# Patient Record
Sex: FEMALE | Race: WHITE | NOT HISPANIC OR LATINO | Employment: OTHER | URBAN - METROPOLITAN AREA
[De-identification: names, ages, dates, MRNs, and addresses within clinical notes are randomized per-mention and may not be internally consistent; named-entity substitution may affect disease eponyms.]

---

## 2017-01-10 ENCOUNTER — HOSPITAL ENCOUNTER (OUTPATIENT)
Dept: MRI IMAGING | Facility: HOSPITAL | Age: 62
Discharge: HOME | End: 2017-01-10
Attending: INTERNAL MEDICINE | Admitting: INTERNAL MEDICINE

## 2017-01-10 DIAGNOSIS — M25.561 ACUTE PAIN OF RIGHT KNEE: ICD-10-CM

## 2017-01-10 PROCEDURE — 73721 MRI JNT OF LWR EXTRE W/O DYE: CPT

## 2017-01-18 ENCOUNTER — HOSPITAL ENCOUNTER (OUTPATIENT)
Dept: GENERAL RADIOLOGY | Facility: HOSPITAL | Age: 62
Discharge: HOME | End: 2017-01-18
Attending: INTERNAL MEDICINE | Admitting: INTERNAL MEDICINE

## 2017-01-18 ENCOUNTER — OFFICE VISIT (OUTPATIENT)
Dept: ORTHOPEDIC SURGERY | Facility: CLINIC | Age: 62
End: 2017-01-18

## 2017-01-18 VITALS — BODY MASS INDEX: 22.88 KG/M2 | HEIGHT: 64 IN | WEIGHT: 134 LBS

## 2017-01-18 DIAGNOSIS — S82.201D TIBIA/FIBULA FRACTURE, RIGHT, CLOSED, WITH ROUTINE HEALING, SUBSEQUENT ENCOUNTER: Primary | ICD-10-CM

## 2017-01-18 DIAGNOSIS — R07.81 RIB PAIN ON LEFT SIDE: ICD-10-CM

## 2017-01-18 DIAGNOSIS — S82.401D TIBIA/FIBULA FRACTURE, RIGHT, CLOSED, WITH ROUTINE HEALING, SUBSEQUENT ENCOUNTER: Primary | ICD-10-CM

## 2017-01-18 PROCEDURE — 99213 OFFICE O/P EST LOW 20 MIN: CPT | Performed by: ORTHOPAEDIC SURGERY

## 2017-01-18 PROCEDURE — 71020 HC CHEST PA AND LATERAL: CPT

## 2017-01-18 NOTE — LETTER
January 22, 2017     Adama Winter MD  [address] [address]    Patient: Elaina Molina   YOB: 1955   Date of Visit: 1/18/2017       Dear Dr. Maggy MD:    Thank you for referring Elaina Molina to me for evaluation. Below are the relevant portions of my assessment and plan of care.    If you have questions, please do not hesitate to call me. I look forward to following Elaina along with you.         Sincerely,        Polo Tucker MD        CC: No Recipients  Polo Tucker MD  1/22/2017  9:36 AM  Signed  Chief Complaint   Patient presents with   • Right Knee - [facility] Care             HPI Here today for Right knee pain which has increased in intensity since the Josh break.  She denies any history of trauma.  She has a difficulty in going up and down the steps where she has a difficult time flexing her knee.  An MRI was obtained which showed that the patient had developed an interval insufficiency fracture off her proximal lateral tibial metaphysis.  There is very significant cartilage loss on the lateral tibial plateau with a marked deformity of the lateral meniscus caused by chronic lateral meniscus tear.  I discussed this finding the patient in great detail.  Clinical findings match her MRI findings I do feel like the stress fracture because of the weakness of the tibial metaphyseal bone structure caused by the relative disuse, the previous injury and also contributory is the fact that she is a heavy smoker.          Allergies   Allergen Reactions   • Codeine          Social History     Social History   • Marital status:      Spouse name: N/A   • Number of children: N/A   • Years of education: N/A     Occupational History   • Not on file.     Social History Main Topics   • Smoking status: Former Smoker   • Smokeless tobacco: Not on file   • Alcohol use No   • Drug use: No   • Sexual activity: Defer     Other Topics Concern   • Not on file     Social  History Narrative       History reviewed. No pertinent family history.    Past Surgical History   Procedure Laterality Date   • Hysterectomy     • Colonoscopy     • Cholecystectomy     • Endoscopy N/A 6/23/2016     Procedure: ESOPHAGOGASTRODUODENOSCOPY with biopsy;  Surgeon: Yan Askew MD;  Location: [facility] ENDOSCOPY;  Service:    • Colonoscopy N/A 6/23/2016     Procedure: COLONOSCOPY to cecum ;  Surgeon: Yan Askew MD;  Location: [facility] ENDOSCOPY;  Service:    • Cataract extraction         Past Medical History   Diagnosis Date   • Celiac disease    • Cervical cancer    • GERD (gastroesophageal reflux disease)    • Hypertension            There were no vitals filed for this visit.          Review of Systems   Constitutional: Negative for chills, fever and unexpected weight change.   HENT: Negative.  Negative for trouble swallowing and voice change.    Eyes: Negative.  Negative for visual disturbance.   Respiratory: Negative.  Negative for cough and shortness of breath.    Cardiovascular: Positive for leg swelling. Negative for chest pain.        If on her feet awhile her right leg swells   Gastrointestinal: Negative.  Negative for abdominal pain, nausea and vomiting.   Endocrine: Negative.  Negative for cold intolerance and heat intolerance.   Genitourinary: Negative.  Negative for difficulty urinating, frequency and urgency.   Musculoskeletal: Negative.    Skin: Negative.  Negative for rash and wound.   Allergic/Immunologic: Negative.  Negative for immunocompromised state.   Neurological: Positive for weakness. Negative for numbness.        In the legs   Hematological: Negative.  Does not bruise/bleed easily.   Psychiatric/Behavioral: Negative.  Negative for dysphoric mood. The patient is not nervous/anxious.            Physical Exam   Constitutional: She appears well-developed and well-nourished.   HENT:   Head: Normocephalic.   Nose: Nose normal.   Eyes: Conjunctivae are normal. Pupils are equal,  round, and reactive to light.   Neck: Normal range of motion.   Cardiovascular: Normal rate and intact distal pulses.    Pulmonary/Chest: Breath sounds normal.   Abdominal: Bowel sounds are normal.   Musculoskeletal: Normal range of motion. She exhibits edema and tenderness.   Neurological: She is alert. She has normal reflexes.   Skin: Skin is warm.   Psychiatric: She has a normal mood and affect. Her behavior is normal. Judgment and thought content normal.   Nursing note and vitals reviewed.              Joint/Body Part Specific Exam:  right knee. Patient has crepitus throughout range of motion. Positive patellar grind test. Mild effusion. Lachman is negative. Pivot shift is negative. Anterior and posterior drawer signs are negative. Significant joint line tenderness is noted on the medial aspect of the knee. Patient has a varus orientation of the knee. Range of motion in flexion is fo110°  degrees. Neurovascular status intact.  Dorsalis pedis and posterior tibial artery pulses are palpable. Common peroneal nerve function is well preserved. Patients gait is cautious and antalgic. Skin and soft tissues are swollen; consistent with synovitis and effusion          X-RAY Report:            Diagnostics:   MRI show a stress or insufficiency fracture of the proximal right tibial metaphysis.  There is associated with advanced chondral loss on the lateral tibial plateau deformity the lateral meniscus caused by chronic tear.  A very small effusion is noted.  These MRI findings and discussion the patient in full detail.           Elaina was seen today for establish care.    Diagnoses and all orders for this visit:    Tibia/fibula fracture, right, closed, with routine healing, subsequent encounter          Procedures          I provided this patient with educational materials regarding exercise and bone health.        Plan:   MRI reports of the stress fracture discussed with the patient great detail.    Recommended  nonoperative care for the patient.    Uses supportive brace to protect the knee.    Calcium and vitamin D for bone health.    Home-based exercise program.  Stretching strengthening exercises of the knee and strengthening of the quads and the hamstrings.    Falls precaution.    Tablet ibuprofen 600 mg tab 1 by mouth twice a day when necessary pain and discomfort.    Patient needs to cut back on smoking and nicotine use because that is making her bone a lot weaker.    Follow-up in 6 weeks.            CC To Adama Winter MD

## 2017-01-18 NOTE — MR AVS SNAPSHOT
Elaina MICHEL Tracy   1/18/2017 1:15 PM   Office Visit    Dept Phone:  [phone]   Encounter #:  [id number]    Provider:  Polo Tucker MD   Department:  [facility]                Your Full Care Plan              Your Updated Medication List          This list is accurate as of: 1/18/17  2:31 PM.  Always use your most recent med list.                amLODIPine 10 MG tablet   Commonly known as:  NORVASC       aspirin 81 MG EC tablet       CALCIUM 1000 + D 1000-800 MG-UNIT tablet   Generic drug:  Calcium Carb-Cholecalciferol       cholecalciferol 1000 UNITS tablet   Commonly known as:  VITAMIN D3       ferrous sulfate 325 (65 FE) MG tablet       omeprazole 40 MG capsule   Commonly known as:  priLOSEC       pentoxifylline 400 MG CR tablet   Commonly known as:  TRENtal       VITAMIN B-6 PO       VITAMIN C PO               Instructions     None    Patient Instructions History      Upcoming Appointments     Visit Type Date Time Department    OFFICE VISIT 1/18/2017  1:15 PM [facility]    FOLLOW UP 3/1/2017 12:50 PM [facility]      via[employer] Signup     Our records indicate that you have an active [employer] account.    You can view your After Visit Summary by going to [employer] and logging in with your [employer] username and password.  If you don't have a [employer] username and password but a parent or guardian has access to your record, the parent or guardian should login with their own [employer] username and password and access your record to view the After Visit Summary.    If you have questions, you can email [email or web address] or call [phone] to talk to our [employer] staff.  Remember, [employer] is NOT to be used for urgent needs.  For medical emergencies, dial 911.               Other Info from Your Visit           Your Appointments     Mar 01, 2017 12:50 PM EST   Follow Up with Polo Tucker MD    "  [facility] (--)    [address] [place] [address]   [phone]           Arrive 15 minutes prior to appointment.              Allergies     Codeine        Reason for Visit     Right Knee - Establish Care           Vital Signs     Height Weight Body Mass Index Smoking Status          64\" (162.6 cm) 134 lb (60.8 kg) 23 kg/m2 Former Smoker          "

## 2017-01-18 NOTE — PROGRESS NOTES
Chief Complaint   Patient presents with   • Right Knee - [facility] Care             HPI Here today for Right knee pain which has increased in intensity since the Josh break.  She denies any history of trauma.  She has a difficulty in going up and down the steps where she has a difficult time flexing her knee.  An MRI was obtained which showed that the patient had developed an interval insufficiency fracture off her proximal lateral tibial metaphysis.  There is very significant cartilage loss on the lateral tibial plateau with a marked deformity of the lateral meniscus caused by chronic lateral meniscus tear.  I discussed this finding the patient in great detail.  Clinical findings match her MRI findings I do feel like the stress fracture because of the weakness of the tibial metaphyseal bone structure caused by the relative disuse, the previous injury and also contributory is the fact that she is a heavy smoker.          Allergies   Allergen Reactions   • Codeine          Social History     Social History   • Marital status:      Spouse name: N/A   • Number of children: N/A   • Years of education: N/A     Occupational History   • Not on file.     Social History Main Topics   • Smoking status: Former Smoker   • Smokeless tobacco: Not on file   • Alcohol use No   • Drug use: No   • Sexual activity: Defer     Other Topics Concern   • Not on file     Social History Narrative       History reviewed. No pertinent family history.    Past Surgical History   Procedure Laterality Date   • Hysterectomy     • Colonoscopy     • Cholecystectomy     • Endoscopy N/A 6/23/2016     Procedure: ESOPHAGOGASTRODUODENOSCOPY with biopsy;  Surgeon: Yan Askew MD;  Location: [facility] ENDOSCOPY;  Service:    • Colonoscopy N/A 6/23/2016     Procedure: COLONOSCOPY to cecum ;  Surgeon: Yan Askew MD;  Location: [facility] ENDOSCOPY;  Service:    • Cataract extraction         Past Medical History   Diagnosis Date   • Celiac  disease    • Cervical cancer    • GERD (gastroesophageal reflux disease)    • Hypertension            There were no vitals filed for this visit.          Review of Systems   Constitutional: Negative for chills, fever and unexpected weight change.   HENT: Negative.  Negative for trouble swallowing and voice change.    Eyes: Negative.  Negative for visual disturbance.   Respiratory: Negative.  Negative for cough and shortness of breath.    Cardiovascular: Positive for leg swelling. Negative for chest pain.        If on her feet awhile her right leg swells   Gastrointestinal: Negative.  Negative for abdominal pain, nausea and vomiting.   Endocrine: Negative.  Negative for cold intolerance and heat intolerance.   Genitourinary: Negative.  Negative for difficulty urinating, frequency and urgency.   Musculoskeletal: Negative.    Skin: Negative.  Negative for rash and wound.   Allergic/Immunologic: Negative.  Negative for immunocompromised state.   Neurological: Positive for weakness. Negative for numbness.        In the legs   Hematological: Negative.  Does not bruise/bleed easily.   Psychiatric/Behavioral: Negative.  Negative for dysphoric mood. The patient is not nervous/anxious.            Physical Exam   Constitutional: She appears well-developed and well-nourished.   HENT:   Head: Normocephalic.   Nose: Nose normal.   Eyes: Conjunctivae are normal. Pupils are equal, round, and reactive to light.   Neck: Normal range of motion.   Cardiovascular: Normal rate and intact distal pulses.    Pulmonary/Chest: Breath sounds normal.   Abdominal: Bowel sounds are normal.   Musculoskeletal: Normal range of motion. She exhibits edema and tenderness.   Neurological: She is alert. She has normal reflexes.   Skin: Skin is warm.   Psychiatric: She has a normal mood and affect. Her behavior is normal. Judgment and thought content normal.   Nursing note and vitals reviewed.              Joint/Body Part Specific Exam:  right knee.  Patient has crepitus throughout range of motion. Positive patellar grind test. Mild effusion. Lachman is negative. Pivot shift is negative. Anterior and posterior drawer signs are negative. Significant joint line tenderness is noted on the medial aspect of the knee. Patient has a varus orientation of the knee. Range of motion in flexion is fo110°  degrees. Neurovascular status intact.  Dorsalis pedis and posterior tibial artery pulses are palpable. Common peroneal nerve function is well preserved. Patients gait is cautious and antalgic. Skin and soft tissues are swollen; consistent with synovitis and effusion          X-RAY Report:            Diagnostics:   MRI show a stress or insufficiency fracture of the proximal right tibial metaphysis.  There is associated with advanced chondral loss on the lateral tibial plateau deformity the lateral meniscus caused by chronic tear.  A very small effusion is noted.  These MRI findings and discussion the patient in full detail.           Elaina was seen today for establish care.    Diagnoses and all orders for this visit:    Tibia/fibula fracture, right, closed, with routine healing, subsequent encounter          Procedures          I provided this patient with educational materials regarding exercise and bone health.        Plan:   MRI reports of the stress fracture discussed with the patient great detail.    Recommended nonoperative care for the patient.    Uses supportive brace to protect the knee.    Calcium and vitamin D for bone health.    Home-based exercise program.  Stretching strengthening exercises of the knee and strengthening of the quads and the hamstrings.    Falls precaution.    Tablet ibuprofen 600 mg tab 1 by mouth twice a day when necessary pain and discomfort.    Patient needs to cut back on smoking and nicotine use because that is making her bone a lot weaker.    Follow-up in 6 weeks.            CC To Adama Winter MD

## 2017-03-01 ENCOUNTER — OFFICE VISIT (OUTPATIENT)
Dept: ORTHOPEDIC SURGERY | Facility: CLINIC | Age: 62
End: 2017-03-01

## 2017-03-01 DIAGNOSIS — S82.201D TIBIA/FIBULA FRACTURE, RIGHT, CLOSED, WITH ROUTINE HEALING, SUBSEQUENT ENCOUNTER: Primary | ICD-10-CM

## 2017-03-01 DIAGNOSIS — M17.11 PRIMARY OSTEOARTHRITIS OF RIGHT KNEE: ICD-10-CM

## 2017-03-01 DIAGNOSIS — S82.401D TIBIA/FIBULA FRACTURE, RIGHT, CLOSED, WITH ROUTINE HEALING, SUBSEQUENT ENCOUNTER: Primary | ICD-10-CM

## 2017-03-01 PROCEDURE — 99213 OFFICE O/P EST LOW 20 MIN: CPT | Performed by: ORTHOPAEDIC SURGERY

## 2017-03-01 NOTE — PROGRESS NOTES
Chief Complaint   Patient presents with   • Right Knee - Results           HPI  The patient is here today for MRI results of her right knee.  Patient walks slowly because of the pain and discomfort on the posterior lateral aspect of the right knee.  She is slowly getting a progressive valgus deformity.  She's had a knee arthroscopy several years ago where a significant amount of the lateral meniscus has been removed.  I do believe she is developing posterior medical arthrosis not only caused by her recent injury were also contributed to by the lateral meniscus pathology and the loss of chondral surface on the lateral tibial plateau's most likely related to post traumatic changes.  She has significant osteoporosis and that leads her to develop a stress fracture which matches her clinical and her MRI findings to each other.  Occasionally the knee will buckle and give out from underneath her.  She has followed several different times.  She is doing reasonably well at this point in terms of symptom control with the use of a brace and anti-inflammatory medication.  I have injected her knee in the past and at this point she is not interested in any form of surgical intervention in the form of a knee arthroplasty.      There were no vitals filed for this visit.        Review of Systems   All other systems reviewed and are negative.          Physical Exam   Constitutional: She appears well-developed.   HENT:   Head: Normocephalic and atraumatic.   Eyes: Conjunctivae are normal. Pupils are equal, round, and reactive to light.   Neck: Normal range of motion. Neck supple.   Cardiovascular: Normal rate and intact distal pulses.    Pulmonary/Chest: Effort normal and breath sounds normal.   Abdominal: Soft. Bowel sounds are normal.   Musculoskeletal: Normal range of motion. She exhibits edema and tenderness.   Neurological: She is alert. She has normal reflexes.   Skin: Skin is warm and dry.   Psychiatric: She has a normal mood  and affect. Her behavior is normal. Judgment and thought content normal.   Nursing note and vitals reviewed.          Joint/Body Part Specific Exam:  right knee. Positive grind test. Pain and tenderness is present over the lateral aspect of the knee. Patient has some tenderness and irritability of the common peroneal nerve. Lateral joint line is exquisitely painful and tender. Patella is tending to track a little bit laterally. There is thickening of the synovial membrane. Range of motion in flexion is 0-120° degrees. Dorsalis pedis and posterior tibial artery pulses are palpable. Common peroneal nerve function is well preserved. Gait is cautious and antalgic. The patient has valgus orientation of the knee with a high degree of external rotation than the contralateral side.      X-RAY Report:        Diagnostics:  MRI report from [facility] shows that there is bone marrow edema in the proximal tibial metaphysis with an incomplete trabecular stress fracture along the posterior part of the tibial metaphysis.  There is no subsidence or displacement of the fracture fragment.  There is some irregularity of the lateral tibial cortex as well with a complex degenerative tear of the posterior horn of the lateral meniscus.  The cruciate and the collateral ligaments are intact.  The small volume of the body of the meniscus is because of a prior meniscectomy.  There is advanced chondral loss of the lateral tibial plateau to signify arthritis as well.  I have discussed the MRI findings with the patient great detail.      Elaina was seen today for results.    Diagnoses and all orders for this visit:    Tibia/fibula fracture, right, closed, with routine healing, subsequent encounter    Primary osteoarthritis of right knee          Procedures        Plan:  Nonoperative care discussed with the patient this point.  She definitely does not want to undergo a knee arthroscopy.    Use a supportive brace to the knee to protect the knee from  buckling and giving out.    Tablet calcium and vitamin D for bone health.    Tablet ibuprofen 600 mg tab 1 by mouth twice a day when necessary pain and discomfort.    Falls precaution.    Home-based exercise program to strengthen the quads and the hamstrings.    Follow-up in my office in 3 months.    She is going to eventually need a total knee replacement.  Both the patient and I would like to wait for as long as possible before considering surgical intervention.

## 2017-04-14 ENCOUNTER — APPOINTMENT (OUTPATIENT)
Dept: WOMENS IMAGING | Facility: HOSPITAL | Age: 62
End: 2017-04-14

## 2017-04-14 PROCEDURE — 77063 BREAST TOMOSYNTHESIS BI: CPT | Performed by: RADIOLOGY

## 2017-04-14 PROCEDURE — G0202 SCR MAMMO BI INCL CAD: HCPCS | Performed by: RADIOLOGY

## 2017-04-18 ENCOUNTER — TRANSCRIBE ORDERS (OUTPATIENT)
Dept: ADMINISTRATIVE | Facility: HOSPITAL | Age: 62
End: 2017-04-18

## 2017-04-18 DIAGNOSIS — M81.0 OSTEOPOROSIS: Primary | ICD-10-CM

## 2017-06-07 ENCOUNTER — OFFICE VISIT (OUTPATIENT)
Dept: ORTHOPEDIC SURGERY | Facility: CLINIC | Age: 62
End: 2017-06-07

## 2017-06-07 DIAGNOSIS — Z87.81 H/O FRACTURE OF ANKLE: Primary | ICD-10-CM

## 2017-06-07 DIAGNOSIS — M17.11 PRIMARY OSTEOARTHRITIS OF RIGHT KNEE: ICD-10-CM

## 2017-06-07 PROCEDURE — 73600 X-RAY EXAM OF ANKLE: CPT | Performed by: ORTHOPAEDIC SURGERY

## 2017-06-07 PROCEDURE — 99213 OFFICE O/P EST LOW 20 MIN: CPT | Performed by: ORTHOPAEDIC SURGERY

## 2017-06-07 NOTE — PROGRESS NOTES
Chief Complaint   Patient presents with   • Right Knee - Follow-up   • Right Ankle - Follow-up           HPI the patient is here today for a follow up of her right knee and right ankle pain.Patient is doing reasonably well as far as the ankle fracture is concerned.  She does not have any significant pain at that location.  She is limping all the time though because of her knee.  The knee hurts her all the time and she states that she has some rest pain as well as night pain.  The pain is on the medial aspect the knee.  It is sharp and stabbing in character and is worse when she is walking up and down the steps.  She is very reluctant to walk on inclined surfaces because of the pain and discomfort.  She has tried intra-articular steroid injections, brace and anti-inflammatory medication with pain and discomfort and is now content templating a total knee arthroplasty.  I do feel that a knee replacement will help her symptoms in the long-term.  I would like to put off the knee replacement surgery for as long as possible.        There were no vitals filed for this visit.        Review of Systems   Constitutional: Negative.    HENT: Negative.    Eyes: Negative.    Respiratory: Negative.    Cardiovascular: Negative.    Gastrointestinal: Negative.    Endocrine: Negative.    Genitourinary: Negative.    Musculoskeletal: Negative.    Skin: Negative.    Allergic/Immunologic: Negative.    Neurological: Negative.    Hematological: Negative.    Psychiatric/Behavioral: Negative.            Physical Exam   Constitutional: She is oriented to person, place, and time. She appears well-nourished.   HENT:   Head: Atraumatic.   Eyes: EOM are normal.   Neck: Neck supple.   Cardiovascular: Normal rate and intact distal pulses.    Pulmonary/Chest: Effort normal and breath sounds normal.   Abdominal: Soft. Bowel sounds are normal.   Musculoskeletal: Normal range of motion.   Neurological: She is alert and oriented to person, place, and time.  She has normal reflexes.   Skin: Skin is warm.   Psychiatric: She has a normal mood and affect. Her behavior is normal. Judgment and thought content normal.   Nursing note and vitals reviewed.          Joint/Body Part Specific Exam:  Right ankle and distal fibula: Patient has some tenderness over the distal fibula.  The ankle mortise is stable.  Dorsiflexion 0-30°.  Plantar flexion 0-40°.  She is able to circumduct the ankle without too much difficulty.  Neurovascular status is intact.  Cap refill is 2 seconds with a brisk return.  Posterior tibial tendon function is well preserved.  Gait is cautious and slightly antalgic with a shortened stance phase.    right knee. Patient has crepitus throughout range of motion. Positive patellar grind test. Mild effusion. Lachman is negative. Pivot shift is negative. Anterior and posterior drawer signs are negative. Significant joint line tenderness is noted on the medial aspect of the knee. Patient has a varus orientation of the knee. Range of motion in flexion is for 0- 120° degrees. Neurovascular status intact.  Dorsalis pedis and posterior tibial artery pulses are palpable. Common peroneal nerve function is well preserved. Patients gait is cautious and antalgic. Skin and soft tissues are swollen; consistent with synovitis and effusion  X-RAY Report:  right Ankle X-Ray  Indication: Revision of healing of the distal fibular fracture  Views: AP, Lateral  Findings: Anatomically reduced, healed fracture  yes fracture  no bony lesion  Soft tissues within normal limits  within normal limits joint spaces  Hardware appropriately positioned not applicable    yes prior studies available for comparison.    X-RAY was ordered and reviewed by Polo Tucker MD        Diagnostics:        Elaina was seen today for follow-up and follow-up.    Diagnoses and all orders for this visit:    H/O fracture of ankle  -     XR Ankle 2 View Right            Procedures        Plan:  Uses supportive brace  to the knee to prevent it from buckling and giving out.    Miacalcin  nasal spray for bone mineral density protection.    Intra-articular steroid injection and Synvisc Visco supplementation discussed and offered to the patient but she declines at this point.    Calcium and vitamin D for bone health.    She will eventually most certainly need a total knee arthroplasty and I have discussed that with her.  She states that she is getting close to needing that the relief from knee pain and discomfort and would like to proceed with a knee arthroplasty possibly in the fall , October or November.    Follow-up in 3 months.

## 2017-09-13 ENCOUNTER — OFFICE VISIT (OUTPATIENT)
Dept: ORTHOPEDIC SURGERY | Facility: CLINIC | Age: 62
End: 2017-09-13

## 2017-09-13 VITALS — WEIGHT: 128 LBS | TEMPERATURE: 98.7 F | BODY MASS INDEX: 21.85 KG/M2 | HEIGHT: 64 IN

## 2017-09-13 DIAGNOSIS — S82.201D TIBIA/FIBULA FRACTURE, RIGHT, CLOSED, WITH ROUTINE HEALING, SUBSEQUENT ENCOUNTER: Primary | ICD-10-CM

## 2017-09-13 DIAGNOSIS — M17.11 PRIMARY OSTEOARTHRITIS OF RIGHT KNEE: ICD-10-CM

## 2017-09-13 DIAGNOSIS — S82.401D TIBIA/FIBULA FRACTURE, RIGHT, CLOSED, WITH ROUTINE HEALING, SUBSEQUENT ENCOUNTER: Primary | ICD-10-CM

## 2017-09-13 PROCEDURE — 99213 OFFICE O/P EST LOW 20 MIN: CPT | Performed by: ORTHOPAEDIC SURGERY

## 2017-09-13 NOTE — PROGRESS NOTES
Chief Complaint   Patient presents with   • Right Knee - Follow-up, Pain   • Right Ankle - Follow-up, Pain           HPI patient continues to have pain and discomfort in the ankle and the right knee.  She is about status quo.  She does have pain and discomfort on the medial aspect the knee.  She walks very gingerly because she is afraid to fall.  Unfortunately her bony skeletal is very fragile on account of the injury as well as the years of nicotine consumption.  She does not want to consider surgical intervention at this point in terms of knee arthroplasty and I certainly agree with that.  She is not going to be able to go back to her job as a  is 89 with her musculoskeletal structures in the current condition she does use a brace on the knee to prevent it from buckling and giving out.  At this point she is getting motivated to join an exercise program to help improve her bone mineral density and the bone mass with a regular exercise program.  In addition she is making a commitment to quit the use of nicotine as well.        Vitals:    09/13/17 1326   Temp: 98.7 °F (37.1 °C)           Review of Systems   Constitutional: Negative for chills, fever and unexpected weight change.   HENT: Negative for trouble swallowing and voice change.    Eyes: Negative for visual disturbance.   Respiratory: Negative for cough and shortness of breath.    Cardiovascular: Negative for chest pain and leg swelling.   Gastrointestinal: Negative for abdominal pain, nausea and vomiting.   Endocrine: Negative for cold intolerance and heat intolerance.   Genitourinary: Negative for difficulty urinating, frequency and urgency.   Musculoskeletal: Positive for arthralgias, joint swelling and myalgias.   Skin: Negative for rash and wound.   Allergic/Immunologic: Negative for immunocompromised state.   Neurological: Negative for weakness and numbness.   Hematological: Does not bruise/bleed easily.   Psychiatric/Behavioral:  Negative for dysphoric mood. The patient is not nervous/anxious.            Physical Exam   Constitutional: She is oriented to person, place, and time. She appears well-nourished.   HENT:   Head: Atraumatic.   Eyes: EOM are normal.   Neck: Neck supple.   Cardiovascular: Normal rate and intact distal pulses.    Pulmonary/Chest: Effort normal and breath sounds normal.   Abdominal: Soft. Bowel sounds are normal.   Musculoskeletal: She exhibits edema and tenderness. She exhibits no deformity.   Neurological: She is alert and oriented to person, place, and time. She has normal reflexes.   Skin: Skin is dry.   Psychiatric: She has a normal mood and affect. Her behavior is normal. Judgment and thought content normal.   Nursing note and vitals reviewed.          Joint/Body Part Specific Exam:  right knee. Patient has crepitus throughout range of motion. Positive patellar grind test. Mild effusion. Lachman is negative. Pivot shift is negative. Anterior and posterior drawer signs are negative. Significant joint line tenderness is noted on the medial aspect of the knee. Patient has a varus orientation of the knee. Range of motion in flexion is for 0- 110 degrees. Neurovascular status intact.  Dorsalis pedis and posterior tibial artery pulses are palpable. Common peroneal nerve function is well preserved. Patients gait is cautious and antalgic. Skin and soft tissues are swollen; consistent with synovitis and effusion    Right tib-fib: There is no clinical deformity.  The syndesmosis is stable.  The distal fibular fracture has completely healed.  The tenderness is consistent with a healed fracture of the fibular diaphysis.  The ankle mortise is stable.  Dorsiflexion 0-20°.  Plantar flexion 0-30°.  She is able to circumduct her ankle without too much difficulty.  She does have some difficulty to get started with her gait pattern after she has been seated in a chair for a while mostly because of her knee symptoms.  X-RAY  Report:    Previously obtained x-rays show significant narrowing of the joint space on the medial aspect of the knee    Diagnostics:        Elaina was seen today for follow-up, pain, follow-up and pain.    Diagnoses and all orders for this visit:    Tibia/fibula fracture, right, closed, with routine healing, subsequent encounter    Primary osteoarthritis of right knee          Procedures        Plan:  Weightbearing as tolerated on the lower extremity with stretching and strengthening exercises.    Use a supportive brace to the knee to prevent it from buckling and giving out.    Tablet ibuprofen 600 mg tab 1 orally daily at bedtime when necessary pain and discomfort.    Patient is going to join an exercise program through her [employer] family and I do encourage her to do that.    She needs to cut back on her smoking so that her posterior processes can be controlled.    Eventually she will need a total knee arthroplasty but at this point her symptoms are quite tolerable and therefore nonoperative care is recommended for the patient.    Calcium and vitamin D for bone health.    Glucosamine and chondroitin for cartilage health.    Falls precautions.    Patient is off work at this point and essentially unable to go back to her job as a     Follow-up in 3 months.

## 2017-10-19 ENCOUNTER — TRANSCRIBE ORDERS (OUTPATIENT)
Dept: ADMINISTRATIVE | Facility: HOSPITAL | Age: 62
End: 2017-10-19

## 2017-10-19 DIAGNOSIS — M81.0 OSTEOPOROSIS OF MULTIPLE SITES: Primary | ICD-10-CM

## 2017-10-31 ENCOUNTER — HOSPITAL ENCOUNTER (OUTPATIENT)
Dept: BONE DENSITY | Facility: HOSPITAL | Age: 62
Discharge: HOME | End: 2017-10-31
Attending: INTERNAL MEDICINE

## 2018-01-03 ENCOUNTER — OFFICE VISIT (OUTPATIENT)
Dept: ORTHOPEDIC SURGERY | Facility: CLINIC | Age: 63
End: 2018-01-03

## 2018-01-03 DIAGNOSIS — M25.561 ACUTE PAIN OF RIGHT KNEE: Primary | ICD-10-CM

## 2018-01-03 DIAGNOSIS — M25.571 CHRONIC PAIN OF RIGHT ANKLE: ICD-10-CM

## 2018-01-03 DIAGNOSIS — M17.31 POST-TRAUMATIC OSTEOARTHRITIS OF RIGHT KNEE: ICD-10-CM

## 2018-01-03 DIAGNOSIS — G89.29 CHRONIC PAIN OF RIGHT ANKLE: ICD-10-CM

## 2018-01-03 PROCEDURE — 99213 OFFICE O/P EST LOW 20 MIN: CPT | Performed by: ORTHOPAEDIC SURGERY

## 2018-01-03 PROCEDURE — 73560 X-RAY EXAM OF KNEE 1 OR 2: CPT | Performed by: ORTHOPAEDIC SURGERY

## 2018-01-03 PROCEDURE — 73600 X-RAY EXAM OF ANKLE: CPT | Performed by: ORTHOPAEDIC SURGERY

## 2018-01-03 NOTE — PROGRESS NOTES
Chief Complaint   Patient presents with   • Right Knee - Follow-up   • Right Ankle - Follow-up           HPI the patient is here today for a follow up of her right knee and right ankle. Patient states that she is doing reasonably well at this point.  Her ankle fracture seems to heal.  She does have a progressive valgus deformity of her right knee.  She has some difficulty going up and down the steps.  Occasionally, the knee will buckle and give out from underneath her.  She has complete understanding that she is going to need a total knee arthroplasty in the near future but she states that at this point her symptoms are tolerable and she would like to avoid surgery for as long as possible.  I certainly agree with a conservative, nonoperative approach for as long as possible.  The ankle fracture has healed and does not require any further surgical considerations.        There were no vitals filed for this visit.        Review of Systems   Constitutional: Negative.    HENT: Negative.    Eyes: Negative.    Respiratory: Negative.    Cardiovascular: Negative.    Gastrointestinal: Negative.    Endocrine: Negative.    Genitourinary: Negative.    Musculoskeletal: Negative.    Skin: Negative.    Allergic/Immunologic: Negative.    Neurological: Negative.    Hematological: Negative.    Psychiatric/Behavioral: Negative.            Physical Exam   Constitutional: She is oriented to person, place, and time. She appears well-nourished.   HENT:   Head: Normocephalic and atraumatic.   Eyes: EOM are normal.   Neck: Neck supple.   Cardiovascular: Normal rate, regular rhythm, normal heart sounds and intact distal pulses.    Pulmonary/Chest: Effort normal and breath sounds normal.   Abdominal: Bowel sounds are normal.   Musculoskeletal: She exhibits edema and tenderness.   Neurological: She is alert and oriented to person, place, and time. She has normal reflexes.   Skin: Skin is dry.   Psychiatric: She has a normal mood and affect. Her  behavior is normal. Judgment and thought content normal.   Nursing note and vitals reviewed.          Joint/Body Part Specific Exam:  right knee. Positive grind test. Pain and tenderness is present over the lateral aspect of the knee. Patient has some tenderness and irritability of the common peroneal nerve. Lateral joint line is exquisitely painful and tender. Patella is tending to track a little bit laterally. There is thickening of the synovial membrane. Range of motion in flexion is 0-110° degrees. Dorsalis pedis and posterior tibial artery pulses are palpable. Common peroneal nerve function is well preserved. Gait is cautious and antalgic. The patient has valgus orientation of the knee with a high degree of external rotation than the contralateral side.    Right ankle: The distal fibular fracture has completely healed.  Neurovascular status is intact.  Dorsiflexion 0-10°.  Plantar flexion 0-30°.  Patient is able to circumduct the ankle without too much difficulty.  There is no clinical deformity.  The syndesmosis is stable.  There is no clinical evidence of an RSD.  Local tenderness is consistent with a healed fracture of the distal fibula.  X-RAY Report:    right Knee X-Ray  Indication: Pain on the lateral aspect of the knee after an injury to the lower extremity  AP, Lateral views  Findings: Progressive valgus deformity with narrowing of the lateral joint space  no bony lesion  Soft tissues within normal limits  decreased joint spaces  Hardware appropriately positioned not applicable      yes prior studies available for comparison.    X-RAY was ordered and reviewed by Polo Tucker MD     right Ankle X-Ray  Indication: Evaluation of healing of a distal fibular fracture  Views: AP, Lateral  Findings: Anatomically acceptable alignment of the distal fibular fracture with healing of the fracture  yes fracture  no bony lesion  Soft tissues within normal limits  within normal limits joint spaces  Hardware  appropriately positioned not applicable    yes prior studies available for comparison.    X-RAY was ordered and reviewed by Polo Tucker MD      Diagnostics:        Elaina was seen today for follow-up and follow-up.    Diagnoses and all orders for this visit:    Acute pain of right knee  -     XR Knee 1 or 2 View Right    Chronic pain of right ankle  -     XR Ankle 2 View Right    Post-traumatic osteoarthritis of right knee            Procedures        Plan:Weightbearing as tolerated with stretching and strengthening of the quads and the hamstrings.    Tablet ibuprofen 600 mg orally daily at bedtime for pain and discomfort.    Calcium and vitamin D for bone health.    Falls precautions.    Use a supportive brace to the right knee to prevent it from buckling and giving out.    Eventually this patient is most certainly going to need a total knee arthroplasty on the right side and I have discussed that with the patient in great detail.    At this point she states that she is really not interested in surgical intervention because her symptoms are manageable and tolerable and I agree with that as well.    Intra-articular steroid injection as well as Synvisc Visco supplementation discussed with the patient and she understands and accepts.    Follow-up in my office in 3 months.

## 2018-02-05 ENCOUNTER — HOSPITAL ENCOUNTER (OUTPATIENT)
Dept: GENERAL RADIOLOGY | Facility: HOSPITAL | Age: 63
Discharge: HOME | End: 2018-02-05
Attending: INTERNAL MEDICINE | Admitting: INTERNAL MEDICINE

## 2018-02-05 DIAGNOSIS — R05.9 COUGH: ICD-10-CM

## 2018-02-05 PROCEDURE — 71046 X-RAY EXAM CHEST 2 VIEWS: CPT

## 2018-04-11 ENCOUNTER — OFFICE VISIT (OUTPATIENT)
Dept: ORTHOPEDIC SURGERY | Facility: CLINIC | Age: 63
End: 2018-04-11

## 2018-04-11 DIAGNOSIS — S82.401D CLOSED FRACTURE OF RIGHT TIBIA AND FIBULA WITH ROUTINE HEALING, SUBSEQUENT ENCOUNTER: Primary | ICD-10-CM

## 2018-04-11 DIAGNOSIS — S82.201D CLOSED FRACTURE OF RIGHT TIBIA AND FIBULA WITH ROUTINE HEALING, SUBSEQUENT ENCOUNTER: Primary | ICD-10-CM

## 2018-04-11 DIAGNOSIS — M17.31 POST-TRAUMATIC OSTEOARTHRITIS OF RIGHT KNEE: ICD-10-CM

## 2018-04-11 PROCEDURE — 99213 OFFICE O/P EST LOW 20 MIN: CPT | Performed by: ORTHOPAEDIC SURGERY

## 2018-04-11 NOTE — PROGRESS NOTES
Chief Complaint   Patient presents with   • Right Knee - Follow-up   • Right Ankle - Follow-up           HPI the patient is here today for right knee and right ankle follow up. The patient is following up on a right distal fibular fracture and posttraumatic arthrosis of the knee.  She is doing a little bit better in terms of physical therapy helping her out with mobility.  She does have pain when she is walking on an inclined surface like going up a hill.  She does have pain and discomfort on steps as well.  Most of the pain is on the lateral aspect of the knee and the fibula as well.  There is no clinical deformity.  She feels a little bit stronger than she has in the past.  We have discussed the pathology of the heel fibular fracture and the posterior medical arthrosis of the knee.  We have also discussed about using a intra-articular steroid injections and Synvisc Visco supplementation for the symptom control.  Eventually she will need a total knee arthroplasty if and as her symptoms progress with knee arthritis.        There were no vitals filed for this visit.        Review of Systems   Constitutional: Negative.    HENT: Negative.    Eyes: Negative.    Respiratory: Negative.    Cardiovascular: Negative.    Gastrointestinal: Negative.    Endocrine: Negative.    Genitourinary: Negative.    Musculoskeletal: Positive for gait problem and joint swelling.   Skin: Negative.    Allergic/Immunologic: Negative.    Hematological: Negative.    Psychiatric/Behavioral: Negative.            Physical Exam   Constitutional: She is oriented to person, place, and time. She appears well-nourished.   HENT:   Head: Atraumatic.   Eyes: EOM are normal.   Neck: Neck supple.   Cardiovascular: Normal rate, regular rhythm, normal heart sounds and intact distal pulses.    Pulmonary/Chest: Breath sounds normal.   Abdominal: Bowel sounds are normal.   Musculoskeletal: She exhibits edema and tenderness.   Neurological: She is alert and  oriented to person, place, and time. She has normal reflexes.   Skin: Skin is dry. Capillary refill takes 2 to 3 seconds.   Psychiatric: She has a normal mood and affect. Her behavior is normal. Judgment and thought content normal.   Nursing note and vitals reviewed.          Joint/Body Part Specific Exam:  right knee. Patient has crepitus throughout range of motion. Positive patellar grind test. Mild effusion. Lachman is negative. Pivot shift is negative. Anterior and posterior drawer signs are negative. Significant joint line tenderness is noted on the medial aspect of the knee. Patient has a varus orientation of the knee. There is fullness and tenderness in the Popliteal fossa. Mild distention of a Popliteal cyst is noted in this location. Range of motion in flexion is from 0- 110 degrees. Neurovascular status is intact.  Dorsalis pedis and posterior tibial artery pulses are palpable. Common peroneal nerve function is well preserved. Patient's gait is cautious and antalgic. Skin and soft tissues are mildly swollen, consistent with synovitis and effusion. The patient has a significant limp with the first few steps after starting the gait cycle. Getting out of a chair takes a lot of effort due to pain on knee flexion.    Right ankle and distal fibula: The neurovascular status is intact.  There is no clinical deformity.  Tenderness over the distal fibular fracture has fully resolved.  Dorsiflexion 0-30°.  Plantar flexion 0-40°.  She is able to circumduct the ankle without too much difficulty.  Swelling of skin and soft tissues has fully resolved as the fracture has now healed.  X-RAY Report:        Diagnostics:        Elaina was seen today for follow-up and follow-up.    Diagnoses and all orders for this visit:    Closed fracture of right tibia and fibula with routine healing, subsequent encounter    Post-traumatic osteoarthritis of right knee            Procedures        Plan:  Stretching and strengthening  exercises of the quads and the hamstrings of the knee.    Use a supportive brace on the knee and on the ankle to prevent buckling and giving out.    Tablet ibuprofen 400 mg orally twice a day for pain swelling and discomfort.    Intra-articular steroid injection and Synvisc Visco supplementation discussed with the patient for symptom management.    Falls precaution.    Calcium and vitamin D for bone health.    Patient is participating in a water therapy/aqua therapy program which I think is an excellent idea for her to regain strength and ale of her gait.    Patient is off from work at  This point and is unlikely to return to work as a .    Follow-up in my office in 3 months for reevaluation.

## 2018-04-16 ENCOUNTER — APPOINTMENT (OUTPATIENT)
Dept: WOMENS IMAGING | Facility: HOSPITAL | Age: 63
End: 2018-04-16

## 2018-04-16 PROCEDURE — 77063 BREAST TOMOSYNTHESIS BI: CPT | Performed by: RADIOLOGY

## 2018-04-16 PROCEDURE — 77067 SCR MAMMO BI INCL CAD: CPT | Performed by: RADIOLOGY

## 2018-08-15 ENCOUNTER — TRANSCRIBE ORDERS (OUTPATIENT)
Dept: ADMINISTRATIVE | Facility: HOSPITAL | Age: 63
End: 2018-08-15

## 2018-08-15 ENCOUNTER — OFFICE VISIT (OUTPATIENT)
Dept: ORTHOPEDIC SURGERY | Facility: CLINIC | Age: 63
End: 2018-08-15

## 2018-08-15 VITALS — HEIGHT: 64 IN | TEMPERATURE: 97.3 F | WEIGHT: 133 LBS | BODY MASS INDEX: 22.71 KG/M2

## 2018-08-15 DIAGNOSIS — I71.40 ABDOMINAL AORTIC ANEURYSM (AAA) WITHOUT RUPTURE (HCC): ICD-10-CM

## 2018-08-15 DIAGNOSIS — M81.0 OSTEOPOROSIS OF LUMBAR SPINE: Primary | ICD-10-CM

## 2018-08-15 DIAGNOSIS — M17.31 POST-TRAUMATIC OSTEOARTHRITIS OF RIGHT KNEE: Primary | ICD-10-CM

## 2018-08-15 DIAGNOSIS — R09.89 BRUIT: ICD-10-CM

## 2018-08-15 PROCEDURE — 99213 OFFICE O/P EST LOW 20 MIN: CPT | Performed by: ORTHOPAEDIC SURGERY

## 2018-08-15 NOTE — PROGRESS NOTES
Chief Complaint   Patient presents with   • Right Knee - Follow-up   • Right Ankle - Follow-up           HPI The patient is here to follow up on her right ankle and her right knee.  Patient had an ankle fracture several years ago.  She then has developed posterior medical osteoarthritis in the right knee as well.  She has difficulty in going up and down steps.  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  She does use a cane for assistance with ambulation.  Over the years she has developed significant pain on the medial aspect of the knee.  By the end of the day she is limping quite significantly.  The pain is a dull nagging sensation on the medial aspect of the knee which radiates to the medial tibia.  Occasionally, the knee will buckle and give out from underneath her.  The ankle fracture has since healed completely.  The patient does have restless leg syndrome and that causes wide a bit of pain and discomfort in the lower extremity.  She has a prescription of medication for that condition from her PCP.  We have discussed the pros and cons of knee replacement surgery but she is not a candidate for that intervention at this point in time.        Vitals:    08/15/18 1500   Temp: 97.3 °F (36.3 °C)           Review of Systems   Constitutional: Negative.    HENT: Negative.    Eyes: Negative.    Respiratory: Negative.    Cardiovascular: Negative.    Gastrointestinal: Negative.    Endocrine: Negative.    Genitourinary: Negative.    Musculoskeletal: Positive for gait problem and joint swelling.   Skin: Negative.    Allergic/Immunologic: Negative.    Hematological: Negative.    Psychiatric/Behavioral: Negative.            Physical Exam   Constitutional: She is oriented to person, place, and time. She appears well-nourished.   HENT:   Head: Atraumatic.   Eyes: EOM are normal.   Neck: Neck supple.   Cardiovascular: Normal heart  sounds and intact distal pulses.    Pulmonary/Chest: Breath sounds normal.   Abdominal: Bowel sounds are normal.   Musculoskeletal: She exhibits edema and tenderness.   Neurological: She is alert and oriented to person, place, and time.   Skin: Skin is dry. Capillary refill takes 2 to 3 seconds.   Psychiatric: She has a normal mood and affect. Her behavior is normal. Judgment and thought content normal.   Nursing note and vitals reviewed.          Joint/Body Part Specific Exam:  right knee. Patient has crepitus throughout range of motion. Positive patellar grind test. Mild effusion. Lachman is negative. Pivot shift is negative. Anterior and posterior drawer signs are negative. Significant joint line tenderness is noted on the medial aspect of the knee. Patient has a varus orientation of the knee. There is fullness and tenderness in the Popliteal fossa. Mild distention of a Popliteal cyst is noted in this location. Range of motion in flexion is from 0- 110 degrees. Neurovascular status is intact.  Dorsalis pedis and posterior tibial artery pulses are palpable. Common peroneal nerve function is well preserved. Patient's gait is cautious and antalgic. Skin and soft tissues are mildly swollen, consistent with synovitis and effusion. The patient has a significant limp with the first few steps after starting the gait cycle. Getting out of a chair takes a lot of effort due to pain on knee flexion.    Right ankle: The distal fibular fracture is completely healed.  Dorsiflexion 0-30°.  Plantar flexion 0-40°.  The patient is able to circumduct the ankle without any difficulty.  There is no clinical deformity.  Swelling and bruising has fully settled down.  Dorsalis pedis artery pulses are palpable.  Gait is cautious but otherwise completely normal.  X-RAY Report:        Diagnostics:        Elaina was seen today for follow-up and follow-up.    Diagnoses and all orders for this visit:    Post-traumatic osteoarthritis of right  knee            Procedures        Plan: Weightbearing as tolerated with stretching and strengthening exercises of the quads and the hamstrings.    Patient needs to cut back on smoking because this is affecting the quality of her bone stock.    Tablet ibuprofen 600 mg orally twice a day for pain swelling and discomfort.    She has restless leg syndrome and is receiving medication for this purpose from her PCP.    Calcium and vitamin D for bone health.    Falls precautions.    There is no indication for a total knee replacement at this point but we have discussed with the patient that she might need an arthroplasty in the future based on the progression of her symptoms.    Follow-up in my office in 3 months for reevaluation.

## 2018-08-27 ENCOUNTER — HOSPITAL ENCOUNTER (OUTPATIENT)
Dept: CARDIOLOGY | Facility: HOSPITAL | Age: 63
Discharge: HOME | End: 2018-08-27
Attending: INTERNAL MEDICINE | Admitting: INTERNAL MEDICINE

## 2018-08-27 ENCOUNTER — HOSPITAL ENCOUNTER (OUTPATIENT)
Dept: ULTRASOUND IMAGING | Facility: HOSPITAL | Age: 63
Discharge: HOME | End: 2018-08-27
Attending: INTERNAL MEDICINE

## 2018-08-27 ENCOUNTER — HOSPITAL ENCOUNTER (OUTPATIENT)
Dept: BONE DENSITY | Facility: HOSPITAL | Age: 63
Discharge: HOME | End: 2018-08-27
Attending: INTERNAL MEDICINE

## 2018-08-27 DIAGNOSIS — I71.40 ABDOMINAL AORTIC ANEURYSM (AAA) WITHOUT RUPTURE (HCC): ICD-10-CM

## 2018-08-27 DIAGNOSIS — M81.0 OSTEOPOROSIS OF LUMBAR SPINE: ICD-10-CM

## 2018-08-27 DIAGNOSIS — R09.89 BRUIT: ICD-10-CM

## 2018-08-27 LAB
BH CV XLRA MEAS LEFT CCA RATIO VEL: 60.1 CM/SEC
BH CV XLRA MEAS LEFT DIST CCA EDV: 11 CM/SEC
BH CV XLRA MEAS LEFT DIST CCA PSV: 60.1 CM/SEC
BH CV XLRA MEAS LEFT DIST ICA EDV: -16 CM/SEC
BH CV XLRA MEAS LEFT DIST ICA PSV: -65 CM/SEC
BH CV XLRA MEAS LEFT ICA RATIO VEL: -115 CM/SEC
BH CV XLRA MEAS LEFT ICA/CCA RATIO: -1.9
BH CV XLRA MEAS LEFT MID ICA EDV: -23.9 CM/SEC
BH CV XLRA MEAS LEFT MID ICA PSV: -95.8 CM/SEC
BH CV XLRA MEAS LEFT PROX CCA EDV: 14.7 CM/SEC
BH CV XLRA MEAS LEFT PROX CCA PSV: 77.4 CM/SEC
BH CV XLRA MEAS LEFT PROX ECA EDV: 11 CM/SEC
BH CV XLRA MEAS LEFT PROX ECA PSV: 126 CM/SEC
BH CV XLRA MEAS LEFT PROX ICA EDV: -24.6 CM/SEC
BH CV XLRA MEAS LEFT PROX ICA PSV: -115 CM/SEC
BH CV XLRA MEAS LEFT PROX SCLA PSV: 153 CM/SEC
BH CV XLRA MEAS LEFT VERTEBRAL A EDV: 14.9 CM/SEC
BH CV XLRA MEAS LEFT VERTEBRAL A PSV: 62.9 CM/SEC
BH CV XLRA MEAS RIGHT CCA RATIO VEL: 91.5 CM/SEC
BH CV XLRA MEAS RIGHT DIST CCA EDV: 17.6 CM/SEC
BH CV XLRA MEAS RIGHT DIST CCA PSV: 91.5 CM/SEC
BH CV XLRA MEAS RIGHT DIST ICA EDV: -22.3 CM/SEC
BH CV XLRA MEAS RIGHT DIST ICA PSV: -88.5 CM/SEC
BH CV XLRA MEAS RIGHT ICA RATIO VEL: -118 CM/SEC
BH CV XLRA MEAS RIGHT ICA/CCA RATIO: -1.3
BH CV XLRA MEAS RIGHT MID ICA EDV: -18.2 CM/SEC
BH CV XLRA MEAS RIGHT MID ICA PSV: -91.5 CM/SEC
BH CV XLRA MEAS RIGHT PROX CCA EDV: -11.4 CM/SEC
BH CV XLRA MEAS RIGHT PROX CCA PSV: -77.4 CM/SEC
BH CV XLRA MEAS RIGHT PROX ECA EDV: 18.1 CM/SEC
BH CV XLRA MEAS RIGHT PROX ECA PSV: 149 CM/SEC
BH CV XLRA MEAS RIGHT PROX ICA EDV: -25.9 CM/SEC
BH CV XLRA MEAS RIGHT PROX ICA PSV: -115 CM/SEC
BH CV XLRA MEAS RIGHT PROX SCLA PSV: 191 CM/SEC
BH CV XLRA MEAS RIGHT VERTEBRAL A EDV: -13.4 CM/SEC
BH CV XLRA MEAS RIGHT VERTEBRAL A PSV: -66.4 CM/SEC
LEFT ARM BP: NORMAL MMHG
RIGHT ARM BP: NORMAL MMHG

## 2018-08-27 PROCEDURE — 93880 EXTRACRANIAL BILAT STUDY: CPT

## 2018-08-27 PROCEDURE — 77080 DXA BONE DENSITY AXIAL: CPT

## 2018-08-27 PROCEDURE — 76775 US EXAM ABDO BACK WALL LIM: CPT

## 2018-12-19 ENCOUNTER — OFFICE VISIT (OUTPATIENT)
Dept: ORTHOPEDIC SURGERY | Facility: CLINIC | Age: 63
End: 2018-12-19

## 2018-12-19 DIAGNOSIS — M17.11 PRIMARY OSTEOARTHRITIS OF RIGHT KNEE: Primary | ICD-10-CM

## 2018-12-19 DIAGNOSIS — G89.29 CHRONIC PAIN OF RIGHT ANKLE: ICD-10-CM

## 2018-12-19 DIAGNOSIS — M25.571 CHRONIC PAIN OF RIGHT ANKLE: ICD-10-CM

## 2018-12-19 DIAGNOSIS — J43.8 OTHER EMPHYSEMA (HCC): ICD-10-CM

## 2018-12-19 PROCEDURE — 99213 OFFICE O/P EST LOW 20 MIN: CPT | Performed by: ORTHOPAEDIC SURGERY

## 2018-12-19 PROCEDURE — 73600 X-RAY EXAM OF ANKLE: CPT | Performed by: ORTHOPAEDIC SURGERY

## 2018-12-19 PROCEDURE — 73560 X-RAY EXAM OF KNEE 1 OR 2: CPT | Performed by: ORTHOPAEDIC SURGERY

## 2018-12-19 NOTE — PROGRESS NOTES
Chief Complaint   Patient presents with   • Right Knee - Follow-up   • Right Ankle - Follow-up           HPI the patient is here today for a follow up of a work related injury to her right ankle and right knee.  The patient states that she has progressive arthritis in the right knee.  Pain is on the medial aspect of the knee.  She has difficulty in going up and down the steps.  She limps quite a bit by the end of the day.  Cross body activities bother the patient significantly.  The patient states that her ankle fracture has completely healed but it is not yet 100%.  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  She is slowly developing posttraumatic osteoarthritis of the knee and eventually is going to most certainly need knee replacement surgery.  The patient understands and accepts that.  At this point however she is tolerating her symptoms well with nonoperative management and I certainly agree with continuing the current line of management until such time that her symptoms can no longer be tolerated with a brace and anti-inflammatory medication.        There were no vitals filed for this visit.        Review of Systems   Constitutional: Negative.    HENT: Negative.    Eyes: Negative.    Respiratory: Negative.    Cardiovascular: Negative.    Gastrointestinal: Negative.    Endocrine: Negative.    Genitourinary: Negative.    Musculoskeletal: Positive for gait problem and joint swelling.   Skin: Negative.    Allergic/Immunologic: Negative.    Hematological: Negative.            Physical Exam   Constitutional: She is oriented to person, place, and time. She appears well-nourished.   HENT:   Head: Atraumatic.   Eyes: EOM are normal.   Neck: Neck supple.   Cardiovascular: Normal heart sounds and intact distal pulses.   Pulmonary/Chest: Breath sounds normal.   Abdominal: Bowel sounds are normal.   Musculoskeletal: She exhibits edema and  tenderness.   Neurological: She is alert and oriented to person, place, and time.   Skin: Capillary refill takes 2 to 3 seconds.   Psychiatric: She has a normal mood and affect. Her behavior is normal. Judgment normal.   Nursing note and vitals reviewed.          Joint/Body Part Specific Exam: Right ankle: The fibular fracture has completely healed.  Ankle mortise is stable.  Dorsiflexion 0-20°.  Plantar flexion is 0-30°.  Patient is able to circumduct the ankle without any difficulty.  The skin and soft tissues have completely healed.  Neurovascular status is intact.  There is no evidence of a reflex sympathetic dystrophy.    right knee. Patient has crepitus throughout range of motion. Positive patellar grind test. Mild effusion. Lachman is negative. Pivot shift is negative. Anterior and posterior drawer signs are negative. Significant joint line tenderness is noted on the medial aspect of the knee. Patient has a varus orientation of the knee. There is fullness and tenderness in the Popliteal fossa. Mild distention of a Popliteal cyst is noted in this location. Range of motion in flexion is from 0- 110 degrees. Neurovascular status is intact.  Dorsalis pedis and posterior tibial artery pulses are palpable. Common peroneal nerve function is well preserved. Patient's gait is cautious and antalgic. Skin and soft tissues are mildly swollen, consistent with synovitis and effusion. The patient has a significant limp with the first few steps after starting the gait cycle. Getting out of a chair takes a lot of effort due to pain on knee flexion.        X-RAY Report:  right Ankle X-Ray  Indication: Evaluation of healing of a distal fibular fracture  Views: AP, Lateral  Findings: Healed distal fibular fracture   yes fracture  no bony lesion  Soft tissues within normal limits  within normal limits joint spaces  Hardware appropriately positioned not applicable    yes prior studies available for comparison.    X-RAY was ordered  and reviewed by Polo Tucker MD    right Knee X-Ray  Indication: Evaluation of pain on the medial aspect of the knee  AP, Lateral views  Findings: Moderately advanced degenerative osteoarthritis with osteopenic skeletal structures  no bony lesion  Soft tissues within normal limits  decreased joint spaces  Hardware appropriately positioned not applicable      yes prior studies available for comparison.    X-RAY was ordered and reviewed by Polo Tucker MD    Diagnostics:        Elaina was seen today for follow-up and follow-up.    Diagnoses and all orders for this visit:    Primary osteoarthritis of right knee  -     XR Knee 1 or 2 View Right    Chronic pain of right ankle  -     XR Ankle 2 View Right    Other emphysema (CMS/HCC)            Procedures        Plan: Use a supportive brace on the knee to prevent it from buckling and giving out.    Medication for osteoporosis discussed with the patient.    Use a supportive brace on the ankle to prevent it from buckling and giving out.    Intra-articular steroid injection and Synvisc Visco supplementation discussed with the patient.    She will eventually need total knee replacement surgery and we have discussed that at length.    Follow-up in my office in 6 months.    Continue to be off work at this point since she is not yet at 100% recovery.

## 2019-01-10 ENCOUNTER — HOSPITAL ENCOUNTER (OUTPATIENT)
Dept: GENERAL RADIOLOGY | Facility: HOSPITAL | Age: 64
Discharge: HOME | End: 2019-01-10
Attending: INTERNAL MEDICINE | Admitting: INTERNAL MEDICINE

## 2019-01-10 DIAGNOSIS — T14.8XXA FRACTURE: ICD-10-CM

## 2019-01-10 PROCEDURE — 71101 X-RAY EXAM UNILAT RIBS/CHEST: CPT

## 2019-01-25 ENCOUNTER — TRANSCRIBE ORDERS (OUTPATIENT)
Dept: ADMINISTRATIVE | Facility: HOSPITAL | Age: 64
End: 2019-01-25

## 2019-01-25 DIAGNOSIS — M81.0 SENILE OSTEOPOROSIS: Primary | ICD-10-CM

## 2019-01-31 PROBLEM — M81.0 AGE-RELATED OSTEOPOROSIS WITHOUT CURRENT PATHOLOGICAL FRACTURE: Status: ACTIVE | Noted: 2019-01-31

## 2019-02-04 ENCOUNTER — APPOINTMENT (OUTPATIENT)
Dept: ONCOLOGY | Facility: HOSPITAL | Age: 64
End: 2019-02-04

## 2019-04-19 ENCOUNTER — APPOINTMENT (OUTPATIENT)
Dept: WOMENS IMAGING | Facility: HOSPITAL | Age: 64
End: 2019-04-19

## 2019-04-19 PROCEDURE — 77063 BREAST TOMOSYNTHESIS BI: CPT | Performed by: RADIOLOGY

## 2019-04-19 PROCEDURE — 77067 SCR MAMMO BI INCL CAD: CPT | Performed by: RADIOLOGY

## 2019-06-26 ENCOUNTER — OFFICE VISIT (OUTPATIENT)
Dept: ORTHOPEDIC SURGERY | Facility: CLINIC | Age: 64
End: 2019-06-26

## 2019-06-26 VITALS — BODY MASS INDEX: 23.04 KG/M2 | WEIGHT: 130 LBS | HEIGHT: 63 IN

## 2019-06-26 DIAGNOSIS — G89.29 CHRONIC PAIN OF RIGHT KNEE: Primary | ICD-10-CM

## 2019-06-26 DIAGNOSIS — J43.8 OTHER EMPHYSEMA (HCC): ICD-10-CM

## 2019-06-26 DIAGNOSIS — M25.561 CHRONIC PAIN OF RIGHT KNEE: Primary | ICD-10-CM

## 2019-06-26 PROCEDURE — 73560 X-RAY EXAM OF KNEE 1 OR 2: CPT | Performed by: ORTHOPAEDIC SURGERY

## 2019-06-26 PROCEDURE — 99213 OFFICE O/P EST LOW 20 MIN: CPT | Performed by: ORTHOPAEDIC SURGERY

## 2019-06-26 RX ORDER — OMEPRAZOLE 10 MG/1
10 CAPSULE, DELAYED RELEASE ORAL AS NEEDED
COMMUNITY
End: 2021-10-26

## 2019-06-26 RX ORDER — DILTIAZEM HYDROCHLORIDE 120 MG/1
CAPSULE, EXTENDED RELEASE ORAL
COMMUNITY
Start: 2019-06-04 | End: 2021-10-26

## 2019-07-14 PROBLEM — M25.561 CHRONIC PAIN OF RIGHT KNEE: Status: ACTIVE | Noted: 2019-07-14

## 2019-07-14 PROBLEM — G89.29 CHRONIC PAIN OF RIGHT KNEE: Status: ACTIVE | Noted: 2019-07-14

## 2019-07-14 PROBLEM — J43.8 OTHER EMPHYSEMA (HCC): Status: ACTIVE | Noted: 2019-07-14

## 2019-10-21 ENCOUNTER — APPOINTMENT (OUTPATIENT)
Dept: GENERAL RADIOLOGY | Facility: HOSPITAL | Age: 64
End: 2019-10-21

## 2019-10-21 ENCOUNTER — APPOINTMENT (OUTPATIENT)
Dept: CT IMAGING | Facility: HOSPITAL | Age: 64
End: 2019-10-21

## 2019-10-21 ENCOUNTER — HOSPITAL ENCOUNTER (EMERGENCY)
Facility: HOSPITAL | Age: 64
Discharge: HOME | End: 2019-10-21
Attending: EMERGENCY MEDICINE | Admitting: EMERGENCY MEDICINE

## 2019-10-21 VITALS
OXYGEN SATURATION: 97 % | SYSTOLIC BLOOD PRESSURE: 179 MMHG | WEIGHT: 130 LBS | DIASTOLIC BLOOD PRESSURE: 80 MMHG | BODY MASS INDEX: 23.04 KG/M2 | HEIGHT: 63 IN | TEMPERATURE: 97.9 F | HEART RATE: 111 BPM | RESPIRATION RATE: 22 BRPM

## 2019-10-21 DIAGNOSIS — M54.6 ACUTE MIDLINE THORACIC BACK PAIN: ICD-10-CM
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
## 2019-10-21 DIAGNOSIS — S13.9XXA ACUTE SPRAIN OF LIGAMENT OF NECK, INITIAL ENCOUNTER: ICD-10-CM

## 2019-10-21 PROCEDURE — 99283 EMERGENCY DEPT VISIT LOW MDM: CPT

## 2019-10-21 PROCEDURE — 99284 EMERGENCY DEPT VISIT MOD MDM: CPT

## 2019-10-21 PROCEDURE — 72125 CT NECK SPINE W/O DYE: CPT

## 2019-10-21 PROCEDURE — 72072 X-RAY EXAM THORAC SPINE 3VWS: CPT

## 2019-10-21 PROCEDURE — 70450 CT HEAD/BRAIN W/O DYE: CPT

## 2019-10-21 RX ORDER — CYCLOBENZAPRINE HCL 10 MG
10 TABLET ORAL 3 TIMES DAILY PRN
Qty: 30 TABLET | Refills: 0 | Status: SHIPPED | OUTPATIENT
Start: 2019-10-21

## 2019-10-21 RX ORDER — ACETAMINOPHEN 500 MG
1000 TABLET ORAL ONCE
Status: COMPLETED | OUTPATIENT
Start: 2019-10-21 | End: 2019-10-21

## 2019-10-21 RX ADMIN — ACETAMINOPHEN 1000 MG: 500 TABLET, FILM COATED ORAL at 11:12
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
## 2019-10-21 NOTE — DISCHARGE INSTRUCTIONS
There were abnormalities in your skull on today's CT scan of the brain.  Please follow-up with your primary care provider for a bone scan.

## 2019-10-24 ENCOUNTER — TELEPHONE (OUTPATIENT)
Dept: ORTHOPEDIC SURGERY | Facility: CLINIC | Age: 64
End: 2019-10-24

## 2019-10-25 NOTE — TELEPHONE ENCOUNTER
I will be glad to see the patient.  She can come into the [place] office on Monday at around 2 PM.  Please call the patient to make an appointment on Monday morning.

## 2019-11-08 ENCOUNTER — OFFICE VISIT (OUTPATIENT)
Dept: ORTHOPEDIC SURGERY | Facility: CLINIC | Age: 64
End: 2019-11-08

## 2019-11-08 VITALS — HEIGHT: 62 IN | BODY MASS INDEX: 23.55 KG/M2 | WEIGHT: 128 LBS | TEMPERATURE: 98.5 F

## 2019-11-08 DIAGNOSIS — S99.921A INJURY OF RIGHT FOOT, INITIAL ENCOUNTER: ICD-10-CM

## 2019-11-08 DIAGNOSIS — S92.354A NONDISPLACED FRACTURE OF FIFTH METATARSAL BONE, RIGHT FOOT, INITIAL ENCOUNTER FOR CLOSED FRACTURE: ICD-10-CM

## 2019-11-08 DIAGNOSIS — S89.91XA INJURY OF RIGHT LOWER EXTREMITY, INITIAL ENCOUNTER: Primary | ICD-10-CM

## 2019-11-08 PROCEDURE — 73620 X-RAY EXAM OF FOOT: CPT | Performed by: ORTHOPAEDIC SURGERY

## 2019-11-08 PROCEDURE — 73590 X-RAY EXAM OF LOWER LEG: CPT | Performed by: ORTHOPAEDIC SURGERY

## 2019-11-08 PROCEDURE — 99213 OFFICE O/P EST LOW 20 MIN: CPT | Performed by: ORTHOPAEDIC SURGERY
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
## 2019-11-10 PROBLEM — S99.921A INJURY OF RIGHT FOOT: Status: ACTIVE | Noted: 2019-11-10

## 2019-11-10 PROBLEM — S92.354A NONDISPLACED FRACTURE OF FIFTH METATARSAL BONE, RIGHT FOOT, INITIAL ENCOUNTER FOR CLOSED FRACTURE: Status: ACTIVE | Noted: 2019-11-10

## 2019-11-10 PROBLEM — S89.91XA INJURY OF RIGHT LEG: Status: ACTIVE | Noted: 2019-11-10

## 2019-12-18 ENCOUNTER — OFFICE VISIT (OUTPATIENT)
Dept: ORTHOPEDIC SURGERY | Facility: CLINIC | Age: 64
End: 2019-12-18

## 2019-12-18 VITALS — BODY MASS INDEX: 23.55 KG/M2 | TEMPERATURE: 98.6 F | WEIGHT: 128 LBS | HEIGHT: 62 IN

## 2019-12-18 DIAGNOSIS — S92.354D CLOSED NONDISPLACED FRACTURE OF FIFTH METATARSAL BONE OF RIGHT FOOT WITH ROUTINE HEALING, SUBSEQUENT ENCOUNTER: Primary | ICD-10-CM

## 2019-12-18 PROCEDURE — 73630 X-RAY EXAM OF FOOT: CPT | Performed by: PHYSICIAN ASSISTANT

## 2019-12-18 PROCEDURE — 99024 POSTOP FOLLOW-UP VISIT: CPT | Performed by: PHYSICIAN ASSISTANT

## 2019-12-30 ENCOUNTER — TREATMENT (OUTPATIENT)
Dept: PHYSICAL THERAPY | Facility: CLINIC | Age: 64
End: 2019-12-30

## 2019-12-30 DIAGNOSIS — S92.354D CLOSED NONDISPLACED FRACTURE OF FIFTH METATARSAL BONE OF RIGHT FOOT WITH ROUTINE HEALING, SUBSEQUENT ENCOUNTER: Primary | ICD-10-CM

## 2019-12-30 PROCEDURE — 97161 PT EVAL LOW COMPLEX 20 MIN: CPT | Performed by: PHYSICAL THERAPIST

## 2019-12-30 PROCEDURE — 97110 THERAPEUTIC EXERCISES: CPT | Performed by: PHYSICAL THERAPIST

## 2019-12-30 PROCEDURE — 97035 APP MDLTY 1+ULTRASOUND EA 15: CPT | Performed by: PHYSICAL THERAPIST
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
## 2020-01-02 ENCOUNTER — TREATMENT (OUTPATIENT)
Dept: PHYSICAL THERAPY | Facility: CLINIC | Age: 65
End: 2020-01-02

## 2020-01-02 DIAGNOSIS — S92.354D CLOSED NONDISPLACED FRACTURE OF FIFTH METATARSAL BONE OF RIGHT FOOT WITH ROUTINE HEALING, SUBSEQUENT ENCOUNTER: Primary | ICD-10-CM

## 2020-01-02 PROCEDURE — 97035 APP MDLTY 1+ULTRASOUND EA 15: CPT | Performed by: PHYSICAL THERAPIST

## 2020-01-02 PROCEDURE — 97110 THERAPEUTIC EXERCISES: CPT | Performed by: PHYSICAL THERAPIST

## 2020-01-02 NOTE — PROGRESS NOTES
Physical Therapy Daily Progress Note          Subjective  Patient reports that the ankle is a little sore today.    Objective   See Exercise, Manual, and Modality Logs for complete treatment.       Assessment/Plan  Patient performed the exercise routine without increased pain or discomfort in the ankle.  Plan to progress the strengthening and weight bearing activities as she can tolerate.                     Manual Therapy:    0     mins  50157;  Therapeutic Exercise:    24     mins  47475;   Neuromuscular Heron:    0    mins  57882;    Therapeutic Activity:     0     mins  56935;     Gait Trainin     mins  72856;     Ultrasound:     8     mins  95679;    Work Hardening           0      mins 17057  Iontophoresis               0   mins 66106    Timed Treatment:   32   mins   Total Treatment:     32   mins    Keyshawn Arreola, PT  Physical Therapist

## 2020-01-06 ENCOUNTER — TREATMENT (OUTPATIENT)
Dept: PHYSICAL THERAPY | Facility: CLINIC | Age: 65
End: 2020-01-06

## 2020-01-06 DIAGNOSIS — S92.354D CLOSED NONDISPLACED FRACTURE OF FIFTH METATARSAL BONE OF RIGHT FOOT WITH ROUTINE HEALING, SUBSEQUENT ENCOUNTER: Primary | ICD-10-CM

## 2020-01-06 PROCEDURE — 97035 APP MDLTY 1+ULTRASOUND EA 15: CPT | Performed by: PHYSICAL THERAPIST

## 2020-01-06 PROCEDURE — 97110 THERAPEUTIC EXERCISES: CPT | Performed by: PHYSICAL THERAPIST

## 2020-01-06 NOTE — PROGRESS NOTES
Physical Therapy Daily Progress Note              Subjective  Patient reports that the foot isn't too bad, rates the pain at 3/10.    Objective   See Exercise, Manual, and Modality Logs for complete treatment.       Assessment/Plan  Subjective reports of pain are slightly less than at her initial visit (4/10).  Patient tolerated the progression of exercises without increased pain or discomfort in the ankle.  Plan to progress the weight bearing activities as she can tolerate.                     Manual Therapy:    0     mins  92957;  Therapeutic Exercise:    27     mins  34170;     Neuromuscular Heron:    0    mins  56456;    Therapeutic Activity:     0     mins  11535;     Gait Trainin     mins  80221;     Ultrasound:     8     mins  79997;    Work Hardening           0      mins 55470  Iontophoresis               0   mins 27974    Timed Treatment:   35   mins   Total Treatment:     35   mins    Keyshawn Arreola, PT  Physical Therapist

## 2020-01-08 ENCOUNTER — TREATMENT (OUTPATIENT)
Dept: PHYSICAL THERAPY | Facility: CLINIC | Age: 65
End: 2020-01-08

## 2020-01-08 DIAGNOSIS — S92.354D CLOSED NONDISPLACED FRACTURE OF FIFTH METATARSAL BONE OF RIGHT FOOT WITH ROUTINE HEALING, SUBSEQUENT ENCOUNTER: Primary | ICD-10-CM

## 2020-01-08 PROCEDURE — 97110 THERAPEUTIC EXERCISES: CPT | Performed by: PHYSICAL THERAPIST

## 2020-01-08 PROCEDURE — 97035 APP MDLTY 1+ULTRASOUND EA 15: CPT | Performed by: PHYSICAL THERAPIST

## 2020-01-08 NOTE — PROGRESS NOTES
Physical Therapy Daily Progress Note             Subjective  Patient reports that the ankle is doing better, currently denies pain.  Patient reports being about 80% back to normal.    Objective   See Exercise, Manual, and Modality Logs for complete treatment.       Assessment/Plan  Subjective reports continue to improve.  Patient tolerated the progression of exercises very well, no reports of pain with the progression of weight bearing activities.                     Manual Therapy:    0     mins  42636;  Therapeutic Exercise:    38     mins  46162;    Neuromuscular Heron:    0    mins  33794;    Therapeutic Activity:     0     mins  45254;     Gait Trainin     mins  40496;     Ultrasound:     8     mins  14760;    Work Hardening           0      mins 22706  Iontophoresis               0   mins 05091    Timed Treatment:   46   mins   Total Treatment:     46   mins    Keyshawn Arreola, PT  Physical Therapist

## 2020-01-13 ENCOUNTER — TREATMENT (OUTPATIENT)
Dept: PHYSICAL THERAPY | Facility: CLINIC | Age: 65
End: 2020-01-13

## 2020-01-13 DIAGNOSIS — S92.354D CLOSED NONDISPLACED FRACTURE OF FIFTH METATARSAL BONE OF RIGHT FOOT WITH ROUTINE HEALING, SUBSEQUENT ENCOUNTER: Primary | ICD-10-CM

## 2020-01-13 PROCEDURE — 97035 APP MDLTY 1+ULTRASOUND EA 15: CPT | Performed by: PHYSICAL THERAPIST

## 2020-01-13 PROCEDURE — 97110 THERAPEUTIC EXERCISES: CPT | Performed by: PHYSICAL THERAPIST

## 2020-01-13 NOTE — PROGRESS NOTES
Physical Therapy Daily Progress Note          Subjective  Patient reports that the ankle feels ok, denies any significant pain.      Objective   See Exercise, Manual, and Modality Logs for complete treatment.       Assessment/Plan  Subjective reports of pain remain low.  Patient performed the routine without pain or discomfort in the ankle.  Proprioception is decreased on the right LE, but balance activities were progressed today.                     Manual Therapy:    0     mins  41833;  Therapeutic Exercise:    40     mins  15786;     Neuromuscular Heron:    0    mins  96194;    Therapeutic Activity:     0     mins  66384;     Gait Trainin     mins  67368;     Ultrasound:     8     mins  06410;    Work Hardening           0      mins 15716  Iontophoresis               0   mins 12159    Timed Treatment:   48   mins   Total Treatment:     48   mins    Keyshawn Arreola, PT  Physical Therapist

## 2020-01-15 ENCOUNTER — TREATMENT (OUTPATIENT)
Dept: PHYSICAL THERAPY | Facility: CLINIC | Age: 65
End: 2020-01-15

## 2020-01-15 DIAGNOSIS — S92.354D CLOSED NONDISPLACED FRACTURE OF FIFTH METATARSAL BONE OF RIGHT FOOT WITH ROUTINE HEALING, SUBSEQUENT ENCOUNTER: Primary | ICD-10-CM

## 2020-01-15 PROCEDURE — 97110 THERAPEUTIC EXERCISES: CPT | Performed by: PHYSICAL THERAPIST

## 2020-01-15 PROCEDURE — 97035 APP MDLTY 1+ULTRASOUND EA 15: CPT | Performed by: PHYSICAL THERAPIST

## 2020-01-15 NOTE — PROGRESS NOTES
Physical Therapy Daily Progress Note         Subjective  Patient reports that the ankle feels alright, currently reports pain rated at 1-2/10.  Patient reports being about 75% back to normal and that most of the pain is at night.      Objective   See Exercise, Manual, and Modality Logs for complete treatment.       Assessment/Plan  Subjective reports of pain remain low.  Patient tolerated the progression of exercises without pain or discomfort in the ankle.  Patient continues to ambulate with a minimal antalgic gait pattern, but this may be attributed to her knee issues.                     Manual Therapy:    0     mins  80610;  Therapeutic Exercise:    45     mins  86549;     Neuromuscular Heron:    0    mins  38662;    Therapeutic Activity:     0     mins  44212;     Gait Trainin     mins  76497;     Ultrasound:     8     mins  83740;    Work Hardening           0      mins 16935  Iontophoresis               0   mins 07408    Timed Treatment:   53   mins   Total Treatment:     53   mins    Keyshawn Arreola, PT  Physical Therapist

## 2020-01-17 ENCOUNTER — TREATMENT (OUTPATIENT)
Dept: PHYSICAL THERAPY | Facility: CLINIC | Age: 65
End: 2020-01-17

## 2020-01-17 DIAGNOSIS — S92.354D CLOSED NONDISPLACED FRACTURE OF FIFTH METATARSAL BONE OF RIGHT FOOT WITH ROUTINE HEALING, SUBSEQUENT ENCOUNTER: Primary | ICD-10-CM

## 2020-01-17 PROCEDURE — 97035 APP MDLTY 1+ULTRASOUND EA 15: CPT | Performed by: PHYSICAL THERAPIST

## 2020-01-17 PROCEDURE — 97110 THERAPEUTIC EXERCISES: CPT | Performed by: PHYSICAL THERAPIST

## 2020-01-17 NOTE — PROGRESS NOTES
Physical Therapy Daily Progress Note             Subjective  Patient reports that the ankle is doing ok, reports that it bothered her last night; currently denies pain.      Objective   See Exercise, Manual, and Modality Logs for complete treatment.       Assessment/Plan  Subjective reports are improved overall.  Patient did well with the progression of exercises, no significant reports of pain with the routine.        Assess at next treatment for MD f/u.             Manual Therapy:    0     mins  49085;  Therapeutic Exercise:    39     mins  13377;    Neuromuscular Heron:    0    mins  14340;    Therapeutic Activity:     0     mins  33411;     Gait Trainin     mins  67736;     Ultrasound:     8     mins  61905;    Work Hardening           0      mins 21552  Iontophoresis               0   mins 45384    Timed Treatment:   47   mins   Total Treatment:     47   mins    Keyshawn Arreola, PT  Physical Therapist

## 2020-01-20 ENCOUNTER — TREATMENT (OUTPATIENT)
Dept: PHYSICAL THERAPY | Facility: CLINIC | Age: 65
End: 2020-01-20

## 2020-01-20 DIAGNOSIS — S92.354D CLOSED NONDISPLACED FRACTURE OF FIFTH METATARSAL BONE OF RIGHT FOOT WITH ROUTINE HEALING, SUBSEQUENT ENCOUNTER: Primary | ICD-10-CM

## 2020-01-20 PROCEDURE — 97035 APP MDLTY 1+ULTRASOUND EA 15: CPT | Performed by: PHYSICAL THERAPIST

## 2020-01-20 PROCEDURE — 97110 THERAPEUTIC EXERCISES: CPT | Performed by: PHYSICAL THERAPIST

## 2020-01-20 NOTE — PROGRESS NOTES
Physical Therapy Daily Progress Note           Subjective  Patient reports that the foot and ankle are doing alright, reports that the pain comes and goes; currently reports pain rated at 1-/10.    Objective       Active Range of Motion     Right Ankle/Foot   Inversion: 14 degrees   Eversion: 0 degrees     Additional Active Range of Motion Details  Right Ankle AROM: DF -4    Strength/Myotome Testing     Right Ankle/Foot   Inversion: 4+  Eversion: 4    Tests     Additional Tests Details  + Inversion Stress Test on right     See Exercise, Manual, and Modality Logs for complete treatment.       Assessment/Plan  Subjective reports continue to improve.  Patient reported that the step activities bothered her today, which she attributed to the weather since they never bothered her before.  Improvements have been made with PT, but deficits persist with regard to pain, AROM, strength, special testing and function.                     Manual Therapy:    0     mins  12515;  Therapeutic Exercise:    42     mins  15549;     Neuromuscular Heron:    0    mins  96485;    Therapeutic Activity:     0     mins  53774;     Gait Trainin     mins  99022;     Ultrasound:     8     mins  67524;    Work Hardening           0      mins 10570  Iontophoresis               0   mins 38661    Timed Treatment:   50   mins   Total Treatment:     50   mins    Keyshawn Arreola PT  Physical Therapist

## 2020-01-22 ENCOUNTER — OFFICE VISIT (OUTPATIENT)
Dept: ORTHOPEDIC SURGERY | Facility: CLINIC | Age: 65
End: 2020-01-22

## 2020-01-22 DIAGNOSIS — S92.354A NONDISPLACED FRACTURE OF FIFTH METATARSAL BONE, RIGHT FOOT, INITIAL ENCOUNTER FOR CLOSED FRACTURE: Primary | ICD-10-CM

## 2020-01-22 PROCEDURE — 73630 X-RAY EXAM OF FOOT: CPT | Performed by: ORTHOPAEDIC SURGERY

## 2020-01-22 PROCEDURE — 99024 POSTOP FOLLOW-UP VISIT: CPT | Performed by: ORTHOPAEDIC SURGERY
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
## 2020-01-29 ENCOUNTER — TREATMENT (OUTPATIENT)
Dept: PHYSICAL THERAPY | Facility: CLINIC | Age: 65
End: 2020-01-29

## 2020-01-29 DIAGNOSIS — S92.354D CLOSED NONDISPLACED FRACTURE OF FIFTH METATARSAL BONE OF RIGHT FOOT WITH ROUTINE HEALING, SUBSEQUENT ENCOUNTER: Primary | ICD-10-CM

## 2020-01-29 PROCEDURE — 97110 THERAPEUTIC EXERCISES: CPT | Performed by: PHYSICAL THERAPIST

## 2020-01-29 PROCEDURE — 97035 APP MDLTY 1+ULTRASOUND EA 15: CPT | Performed by: PHYSICAL THERAPIST

## 2020-01-29 NOTE — PROGRESS NOTES
Physical Therapy Daily Progress Note         Subjective  Patient reports that the ankle is a little better, currently rates the pain at 1/10.    Objective   See Exercise, Manual, and Modality Logs for complete treatment.       Assessment/Plan  Subjective reports of pain are low today.  Patient continues to ambulate with a minimal antalgic gait pattern, but this could be attributed to the knee issues (patient feels like she is walking like she did before the injury).  Patient performed the exercise routine without pain in the right ankle or foot.                 Manual Therapy:    0     mins  64971;  Therapeutic Exercise:    40     mins  66965;    Neuromuscular Heron:    0    mins  33714;    Therapeutic Activity:     0     mins  34700;     Gait Trainin     mins  06613;     Ultrasound:     8     mins  68699;    Work Hardening           0      mins 07100  Iontophoresis               0   mins 41020    Timed Treatment:   48   mins   Total Treatment:     48   mins    Keyshawn Arreola, PT  Physical Therapist

## 2020-02-05 ENCOUNTER — TREATMENT (OUTPATIENT)
Dept: PHYSICAL THERAPY | Facility: CLINIC | Age: 65
End: 2020-02-05

## 2020-02-05 DIAGNOSIS — S92.354D CLOSED NONDISPLACED FRACTURE OF FIFTH METATARSAL BONE OF RIGHT FOOT WITH ROUTINE HEALING, SUBSEQUENT ENCOUNTER: Primary | ICD-10-CM

## 2020-02-05 PROCEDURE — 97035 APP MDLTY 1+ULTRASOUND EA 15: CPT | Performed by: PHYSICAL THERAPIST

## 2020-02-05 PROCEDURE — 97110 THERAPEUTIC EXERCISES: CPT | Performed by: PHYSICAL THERAPIST

## 2020-02-05 NOTE — PROGRESS NOTES
Physical Therapy Daily Progress Note              Subjective  Patient reports pain in the ankle rated at 3/10.    Objective   See Exercise, Manual, and Modality Logs for complete treatment.       Assessment/Plan  Subjective reports are improved overall, but the patient continues to have lingering pain.  Patient performed the exercise routine with minimal reports of pain with the AROM of the right ankle, but no reports of pain otherwise.                     Manual Therapy:    0     mins  20233;  Therapeutic Exercise:     42    mins  00919;     Neuromuscular Heron:    0    mins  57416;    Therapeutic Activity:     0     mins  69787;     Gait Trainin     mins  61180;     Ultrasound:     8     mins  61178;    Work Hardening           0      mins 98913  Iontophoresis               0   mins 18826    Timed Treatment:   50   mins   Total Treatment:     50   mins    Keyshawn Arreola, PT  Physical Therapist

## 2020-02-12 ENCOUNTER — TREATMENT (OUTPATIENT)
Dept: PHYSICAL THERAPY | Facility: CLINIC | Age: 65
End: 2020-02-12

## 2020-02-12 DIAGNOSIS — S92.354D CLOSED NONDISPLACED FRACTURE OF FIFTH METATARSAL BONE OF RIGHT FOOT WITH ROUTINE HEALING, SUBSEQUENT ENCOUNTER: Primary | ICD-10-CM

## 2020-02-12 PROCEDURE — 97035 APP MDLTY 1+ULTRASOUND EA 15: CPT | Performed by: PHYSICAL THERAPIST

## 2020-02-12 PROCEDURE — 97110 THERAPEUTIC EXERCISES: CPT | Performed by: PHYSICAL THERAPIST
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
## 2020-02-19 ENCOUNTER — TREATMENT (OUTPATIENT)
Dept: PHYSICAL THERAPY | Facility: CLINIC | Age: 65
End: 2020-02-19

## 2020-02-19 DIAGNOSIS — S92.354D CLOSED NONDISPLACED FRACTURE OF FIFTH METATARSAL BONE OF RIGHT FOOT WITH ROUTINE HEALING, SUBSEQUENT ENCOUNTER: Primary | ICD-10-CM

## 2020-02-19 PROCEDURE — 97110 THERAPEUTIC EXERCISES: CPT | Performed by: PHYSICAL THERAPIST

## 2020-02-19 PROCEDURE — 97035 APP MDLTY 1+ULTRASOUND EA 15: CPT | Performed by: PHYSICAL THERAPIST
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
## 2020-02-27 ENCOUNTER — TREATMENT (OUTPATIENT)
Dept: PHYSICAL THERAPY | Facility: CLINIC | Age: 65
End: 2020-02-27

## 2020-02-27 DIAGNOSIS — S92.354D CLOSED NONDISPLACED FRACTURE OF FIFTH METATARSAL BONE OF RIGHT FOOT WITH ROUTINE HEALING, SUBSEQUENT ENCOUNTER: Primary | ICD-10-CM

## 2020-02-27 PROCEDURE — 97110 THERAPEUTIC EXERCISES: CPT | Performed by: PHYSICAL THERAPIST

## 2020-02-27 PROCEDURE — 97035 APP MDLTY 1+ULTRASOUND EA 15: CPT | Performed by: PHYSICAL THERAPIST

## 2020-02-27 NOTE — PROGRESS NOTES
Physical Therapy Daily Progress Note             Subjective  Patient reports that the foot and ankle are bothering her a little today, rates the pain at 3/10.    Objective   See Exercise, Manual, and Modality Logs for complete treatment.       Assessment/Plan  Subjective reports are slightly elevated today, but the pain rating is still low.  Patient performed the exercise routine without increased pain in the right ankle or foot.                   Manual Therapy:    0     mins  57726;  Therapeutic Exercise:    38     mins  75057;    Neuromuscular Heron:    0    mins  95405;    Therapeutic Activity:     0     mins  16108;     Gait Trainin     mins  31407;     Ultrasound:     8     mins  50416;    Work Hardening           0      mins 89550  Iontophoresis               0   mins 36392    Timed Treatment:   46   mins   Total Treatment:     46   mins    Keyshawn Arreola, PT  Physical Therapist

## 2020-03-04 ENCOUNTER — TREATMENT (OUTPATIENT)
Dept: PHYSICAL THERAPY | Facility: CLINIC | Age: 65
End: 2020-03-04

## 2020-03-04 DIAGNOSIS — S92.354D CLOSED NONDISPLACED FRACTURE OF FIFTH METATARSAL BONE OF RIGHT FOOT WITH ROUTINE HEALING, SUBSEQUENT ENCOUNTER: Primary | ICD-10-CM

## 2020-03-04 PROCEDURE — 97035 APP MDLTY 1+ULTRASOUND EA 15: CPT | Performed by: PHYSICAL THERAPIST

## 2020-03-04 PROCEDURE — 97110 THERAPEUTIC EXERCISES: CPT | Performed by: PHYSICAL THERAPIST

## 2020-03-04 NOTE — PROGRESS NOTES
Physical Therapy Daily Progress Note              Subjective  Patient reports that the foot and ankle are doing alright, currently rates the pain at 1-2/10.  Patient reports that it is close to being back to normal, but it still bothers her at times.    Objective   See Exercise, Manual, and Modality Logs for complete treatment.       Assessment/Plan  Subjective reports are improved overall, but minimal pain persists.  Function is greatly improved, patient reports doing everything she wants to do.  Patient reported some pain in the ankle with the lateral steps ups, but had no pain otherwise.                     Manual Therapy:    0     mins  88504;  Therapeutic Exercise:    40     mins  25498;    Neuromuscular Heron:    0    mins  97412;    Therapeutic Activity:     0     mins  09484;     Gait Trainin     mins  85592;     Ultrasound:     8     mins  58338;    Work Hardening           0      mins 32367  Iontophoresis               0   mins 41515    Timed Treatment:   48   mins   Total Treatment:     48   mins    Keyshawn Arreola, PT  Physical Therapist

## 2020-03-18 ENCOUNTER — DOCUMENTATION (OUTPATIENT)
Dept: PHYSICAL THERAPY | Facility: CLINIC | Age: 65
End: 2020-03-18

## 2020-03-18 ENCOUNTER — TREATMENT (OUTPATIENT)
Dept: PHYSICAL THERAPY | Facility: CLINIC | Age: 65
End: 2020-03-18

## 2020-03-18 DIAGNOSIS — S92.354D CLOSED NONDISPLACED FRACTURE OF FIFTH METATARSAL BONE OF RIGHT FOOT WITH ROUTINE HEALING, SUBSEQUENT ENCOUNTER: Primary | ICD-10-CM

## 2020-03-18 PROCEDURE — 97110 THERAPEUTIC EXERCISES: CPT | Performed by: PHYSICAL THERAPIST

## 2020-03-18 PROCEDURE — 97035 APP MDLTY 1+ULTRASOUND EA 15: CPT | Performed by: PHYSICAL THERAPIST

## 2020-03-18 NOTE — PROGRESS NOTES
Physical Therapy Daily Progress Note             Subjective  Patient reports pain rated at 1/10.    Objective   See Exercise, Manual, and Modality Logs for complete treatment.       Assessment/Plan  Subjective reports of pain remain low, but the patient continues to have lingering pain with activities.  Patient had minimal pain with the rocker board, but had no pain or discomfort otherwise.        Patient will continue with the HEP at this time.               Manual Therapy:    0     mins  44898;  Therapeutic Exercise:    40     mins  75099;     Neuromuscular Heron:    0    mins  98662;    Therapeutic Activity:     0     mins  58612;     Gait Trainin     mins  26637;     Ultrasound:     8     mins  36155;    Work Hardening           0      mins 83453  Iontophoresis               0   mins 01676    Timed Treatment:   48   mins   Total Treatment:     48   mins    Keyshawn Arreola, PT  Physical Therapist

## 2020-03-25 ENCOUNTER — OFFICE VISIT (OUTPATIENT)
Dept: ORTHOPEDIC SURGERY | Facility: CLINIC | Age: 65
End: 2020-03-25

## 2020-03-25 DIAGNOSIS — Z87.81 H/O FRACTURE OF ANKLE: Primary | ICD-10-CM

## 2020-03-25 DIAGNOSIS — J43.8 OTHER EMPHYSEMA (HCC): ICD-10-CM

## 2020-03-25 DIAGNOSIS — M17.11 PRIMARY OSTEOARTHRITIS OF RIGHT KNEE: ICD-10-CM

## 2020-03-25 PROCEDURE — 99213 OFFICE O/P EST LOW 20 MIN: CPT | Performed by: ORTHOPAEDIC SURGERY

## 2020-03-25 RX ORDER — ATORVASTATIN CALCIUM 20 MG/1
20 TABLET, FILM COATED ORAL DAILY
COMMUNITY
Start: 2019-11-14 | End: 2020-11-13

## 2020-03-25 NOTE — PROGRESS NOTES
FOLLOW UP VISIT    Patient: Elaina Molina  ?  YOB: 1955    MRN: [id number]  ?  Cc: Follow-up on posttraumatic osteoarthritis of the right knee and a right ankle injury.  ?  HPI: F/U R KNEE WORK COMP  Elaina Dunlap sustained an injury to her right leg while she was at work.  This has led to posttraumatic osteoarthritis of the knee.  She also has sustained a reinjury to her ankle.  She states that physical therapy has helped her ankle tremendously to improve for the better.  Range of motion has progressively improved.  Swelling and bruising have settled down nicely as the fracture has healed.  She states that she does have some leg cramps and a restless leg syndrome.  She has switched primary care providers and states that the new doctor does not prescribe her the medication that she needs for the restless leg syndrome.  I have expressed inability to prescribe any form of narcotic for her because of current state prescribing regulations.      Pain Location: right knee and right ankle  Radiation: none  Quality: dull, aching  Intensity/Severity: mild   Duration: several months  Onset quality: gradual   Timing: intermittent  Aggravating Factors: going up and down stairs, kneeling  Alleviating Factors: Tylenol  Previous Episodes: yes  Associated Symptoms: pain, decreased strength  ADLs Affected: ambulating, recreational activities/sports  Previous Treatment: Nonoperative management of the ankle fracture.    This patient is an established patient.  This problem is not new to this examiner.      Allergies:   Allergies   Allergen Reactions   • Codeine Nausea And Vomiting       Medications:   Home Medications:  Current Outpatient Medications on File Prior to Visit   Medication Sig   • amLODIPine (NORVASC) 10 MG tablet daily.   • Ascorbic Acid (VITAMIN C PO) Take  by mouth.   • aspirin 81 MG EC tablet Take 81 mg by mouth daily.   • atorvastatin (LIPITOR) 20 MG tablet Take 20 mg by mouth Daily.   •  Calcium Carb-Cholecalciferol (CALCIUM 1000 + D) 1000-800 MG-UNIT tablet Take  by mouth.   • calcium carbonate-vitamin d 600-400 MG-UNIT per tablet Take 2 tablets by mouth Daily.   • CARTIA  MG 24 hr capsule    • cholecalciferol (VITAMIN D3) 1000 UNITS tablet Take 1,000 Units by mouth daily.   • cyclobenzaprine (FLEXERIL) 10 MG tablet Take 1 tablet by mouth 3 (Three) Times a Day As Needed for Muscle Spasms.   • ferrous sulfate 325 (65 FE) MG tablet Take 325 mg by mouth Daily With Breakfast.   • Multiple Vitamins-Minerals (MULTIVITAL) tablet Take 1 tablet by mouth Daily.   • omeprazole (prilOSEC) 10 MG capsule Take 10 mg by mouth Daily.   • omeprazole (PriLOSEC) 40 MG capsule daily.   • pentoxifylline (TRENtal) 400 MG CR tablet TAKE 1 TABLET PO EVERY 8 HOURS FOR LEG PAIN   • Pyridoxine HCl (VITAMIN B-6 PO) Take  by mouth.     No current facility-administered medications on file prior to visit.      Current Medications:  Scheduled Meds:  PRN Meds:.    I have reviewed the patient's medical history in detail and updated the computerized patient record.  Review and summarization of old records include:    Past Medical History:   Diagnosis Date   • Celiac disease    • Cervical cancer (CMS/HCC)    • GERD (gastroesophageal reflux disease)    • Hypertension      Past Surgical History:   Procedure Laterality Date   • CATARACT EXTRACTION     • CHOLECYSTECTOMY     • COLONOSCOPY     • COLONOSCOPY N/A 6/23/2016    Procedure: COLONOSCOPY to cecum ;  Surgeon: Yan Askew MD;  Location: [facility] ENDOSCOPY;  Service:    • ENDOSCOPY N/A 6/23/2016    Procedure: ESOPHAGOGASTRODUODENOSCOPY with biopsy;  Surgeon: Yan Askew MD;  Location: [facility] ENDOSCOPY;  Service:    • HYSTERECTOMY       Social History     Occupational History   • Not on file   Tobacco Use   • Smoking status: Former Smoker   Substance and Sexual Activity   • Alcohol use: No   • Drug use: No   • Sexual activity: Defer      History reviewed. No pertinent  "family history.      Review of Systems  A 9 point review of systems was performed which was essentially negative for any influence on the knee and ankle pain.    Wt Readings from Last 3 Encounters:   12/18/19 58.1 kg (128 lb)   11/08/19 58.1 kg (128 lb)   10/21/19 59 kg (130 lb)     Ht Readings from Last 3 Encounters:   12/18/19 156.2 cm (61.5\")   11/08/19 156.2 cm (61.5\")   10/21/19 160 cm (63\")     There is no height or weight on file to calculate BMI.  No height and weight on file for this encounter.  There were no vitals filed for this visit.      Physical Exam  Constitutional: Patient is oriented to person, place, and time. Appears well-developed and well-nourished.   HENT:   Head: Normocephalic and atraumatic.   Eyes: Conjunctivae and EOM are normal. Pupils are equal, round, and reactive to light.   Cardiovascular: Normal rate, regular rhythm, normal heart sounds and intact distal pulses.   Pulmonary/Chest: Effort normal and breath sounds normal.   Musculoskeletal:   See detailed exam below   Neurological: Alert and oriented to person, place, and time. No sensory deficit. Coordination normal.   Skin: Skin is warm and dry. Capillary refill takes less than 2 seconds. No rash noted. No erythema.   Psychiatric: Patient has a normal mood and affect. Her behavior is normal. Judgment and thought content normal.   Nursing note and vitals reviewed.      Ortho Exam:   Right knee (varus). Patient has crepitus throughout range of motion. Positive patellar grind test. Mild effusion. Lachman is negative. Pivot shift is negative. Anterior and posterior drawer signs are negative. Significant joint line tenderness is noted on the medial aspect of the knee. Patient has a varus orientation of the knee. There is fullness and tenderness in the Popliteal fossa. Mild distention of a Popliteal cyst is noted in this location. Range of motion in flexion is from 0-110 degrees. Neurovascular status is intact.  Dorsalis pedis and " posterior tibial artery pulses are palpable. Common peroneal nerve function is well preserved. Patient's gait is cautious and antalgic. Skin and soft tissues are mildly swollen, consistent with synovitis and effusion. The patient has a significant limp with the first few steps after starting the gait cycle. Getting out of a chair takes a lot of effort due to pain on knee flexion.   Right ankle/foot-sinus tarsi. The patient has restriction of dorsiflexion and plantarflexion. There is a boggy swelling on the anterolateral aspect of the sinus tarsi. Eversion against resistance bothers the patient significantly. Capillary refill is 2 seconds with a brisk return.  Dorsiflexion is 0-20 degrees, plantarflexion 0-30 degrees. Patient has a difficult time circumducting the ankle. There is some diffuse ill-defined tenderness over the anterior joint line of the tibiotalar articulation as well.       Diagnostics:  no diagnostic testing performed this visit      Assessment:  Diagnoses and all orders for this visit:    H/O fracture of ankle    Other emphysema (CMS/HCC)    Primary osteoarthritis of right knee          Procedures  ?    Plan    · Compression/brace to the ankle to prevent buckling and giving out and to protect the injuries from recurring to the soft tissues and the bone.  · Calcium and vitamin D for bone health.  · Continue with a home-based exercise program once a physical therapy has been completed.  · Discussed with the patient about intra-articular injections of steroid and Visco supplementation injections to the knee for symptom management.  · Rest, ice, compression, and elevation (RICE) therapy  · Stretching and strengthening exercises of the quads and the hamstrings.  · Falls precautions.  · Glucosamine, chondroitin and turmeric for cartilage health.  · Tylenol 500-1000mg by mouth every 6 hours as needed for pain   · Follow up in 3 month(s)    Date of encounter: 03/25/2020   Polo Tucker MD

## 2020-06-09 ENCOUNTER — APPOINTMENT (OUTPATIENT)
Dept: WOMENS IMAGING | Facility: HOSPITAL | Age: 65
End: 2020-06-09

## 2020-06-09 PROCEDURE — 77067 SCR MAMMO BI INCL CAD: CPT | Performed by: RADIOLOGY

## 2020-06-09 PROCEDURE — 77063 BREAST TOMOSYNTHESIS BI: CPT | Performed by: RADIOLOGY

## 2020-06-24 ENCOUNTER — OFFICE VISIT (OUTPATIENT)
Dept: ORTHOPEDIC SURGERY | Facility: CLINIC | Age: 65
End: 2020-06-24

## 2020-06-24 VITALS — WEIGHT: 129.8 LBS | TEMPERATURE: 97.1 F | BODY MASS INDEX: 22.16 KG/M2 | HEIGHT: 64 IN

## 2020-06-24 DIAGNOSIS — R52 PAIN: Primary | ICD-10-CM

## 2020-06-24 DIAGNOSIS — M25.571 CHRONIC PAIN OF RIGHT ANKLE: ICD-10-CM

## 2020-06-24 DIAGNOSIS — G89.29 CHRONIC PAIN OF RIGHT ANKLE: ICD-10-CM

## 2020-06-24 DIAGNOSIS — M17.31 POST-TRAUMATIC OSTEOARTHRITIS OF RIGHT KNEE: ICD-10-CM

## 2020-06-24 PROCEDURE — 99213 OFFICE O/P EST LOW 20 MIN: CPT | Performed by: ORTHOPAEDIC SURGERY

## 2020-06-24 PROCEDURE — 73560 X-RAY EXAM OF KNEE 1 OR 2: CPT | Performed by: ORTHOPAEDIC SURGERY

## 2020-06-24 NOTE — PROGRESS NOTES
"FOLLOW UP VISIT    Patient: Elaina Molina  ?  YOB: 1955    MRN: [id number]  ?  Chief Complaint   Patient presents with   • Right Knee - Follow-up, Pain      ?  HPI:   Elaina Dunlap presents to the office with ongoing pain and discomfort of the knee.  She has developed posttraumatic arthritis following an injury several years ago.  The distal fibular fracture has completely healed.  She is complaining of a \"knot \"on the medial aspect of the knee.  This appears to be a thrombosed vein.  This seems to be in the location of soft tissue trauma when the door of a van showed onto her knee.  She finds it difficult to go up and down the steps.  She has significant difficulty in walking on uneven surfaces.  I have discussed with her about the use of a brace and anti-inflammatory medication to help with her symptoms.      Pain Location: right knee  Radiation: none  Quality: dull, aching  Intensity/Severity: moderate  Duration: several years  Onset quality: gradual   Timing: intermittent  Aggravating Factors: going up and down stairs, kneeling  Alleviating Factors: NSAIDs  Previous Episodes: yes  Associated Symptoms: pain, swelling  ADLs Affected: ambulating  Previous Treatment: brace to the knee and nsaids.    This patient is an established patient.  This problem is not new to this examiner.      Allergies:   Allergies   Allergen Reactions   • Codeine Nausea And Vomiting       Medications:   Home Medications:  Current Outpatient Medications on File Prior to Visit   Medication Sig   • amLODIPine (NORVASC) 10 MG tablet daily.   • Ascorbic Acid (VITAMIN C PO) Take  by mouth.   • aspirin 81 MG EC tablet Take 81 mg by mouth daily.   • atorvastatin (LIPITOR) 20 MG tablet Take 20 mg by mouth Daily.   • Calcium Carb-Cholecalciferol (CALCIUM 1000 + D) 1000-800 MG-UNIT tablet Take  by mouth.   • calcium carbonate-vitamin d 600-400 MG-UNIT per tablet Take 2 tablets by mouth Daily.   • CARTIA  MG 24 hr " capsule    • cholecalciferol (VITAMIN D3) 1000 UNITS tablet Take 1,000 Units by mouth daily.   • cyclobenzaprine (FLEXERIL) 10 MG tablet Take 1 tablet by mouth 3 (Three) Times a Day As Needed for Muscle Spasms.   • ferrous sulfate 325 (65 FE) MG tablet Take 325 mg by mouth Daily With Breakfast.   • Multiple Vitamins-Minerals (MULTIVITAL) tablet Take 1 tablet by mouth Daily.   • omeprazole (prilOSEC) 10 MG capsule Take 10 mg by mouth Daily.   • omeprazole (PriLOSEC) 40 MG capsule daily.   • pentoxifylline (TRENtal) 400 MG CR tablet TAKE 1 TABLET PO EVERY 8 HOURS FOR LEG PAIN   • Pyridoxine HCl (VITAMIN B-6 PO) Take  by mouth.     No current facility-administered medications on file prior to visit.      Current Medications:  Scheduled Meds:  PRN Meds:.    I have reviewed the patient's medical history in detail and updated the computerized patient record.  Review and summarization of old records include:    Past Medical History:   Diagnosis Date   • Celiac disease    • Cervical cancer (CMS/HCC)    • GERD (gastroesophageal reflux disease)    • Hypertension      Past Surgical History:   Procedure Laterality Date   • CATARACT EXTRACTION     • CHOLECYSTECTOMY     • COLONOSCOPY     • COLONOSCOPY N/A 6/23/2016    Procedure: COLONOSCOPY to cecum ;  Surgeon: Yan Askew MD;  Location: [facility] ENDOSCOPY;  Service:    • ENDOSCOPY N/A 6/23/2016    Procedure: ESOPHAGOGASTRODUODENOSCOPY with biopsy;  Surgeon: Yan Askew MD;  Location: [facility] ENDOSCOPY;  Service:    • HYSTERECTOMY       Social History     Occupational History   • Not on file   Tobacco Use   • Smoking status: Former Smoker   • Smokeless tobacco: Never Used   Substance and Sexual Activity   • Alcohol use: No   • Drug use: No   • Sexual activity: Defer      History reviewed. No pertinent family history.      Review of Systems   Constitutional: Negative.  Negative for fever.   HENT: Negative.    Eyes: Negative.    Respiratory: Negative.    Cardiovascular:  "Negative.    Endocrine: Negative.    Genitourinary: Negative.    Musculoskeletal: Positive for arthralgias, gait problem and joint swelling.   Skin: Negative.  Negative for rash and wound.   Allergic/Immunologic: Negative.    Neurological: Negative for numbness.   Hematological: Negative.    Psychiatric/Behavioral: Negative.           Wt Readings from Last 3 Encounters:   06/24/20 58.9 kg (129 lb 12.8 oz)   12/18/19 58.1 kg (128 lb)   11/08/19 58.1 kg (128 lb)     Ht Readings from Last 3 Encounters:   06/24/20 162.6 cm (64\")   12/18/19 156.2 cm (61.5\")   11/08/19 156.2 cm (61.5\")     Body mass index is 22.28 kg/m².  Facility age limit for growth percentiles is 20 years.  Vitals:    06/24/20 1252   Temp: 97.1 °F (36.2 °C)         Physical Exam  Constitutional: Patient is oriented to person, place, and time. Appears well-developed and well-nourished.   HENT:   Head: Normocephalic and atraumatic.   Eyes: Conjunctivae and EOM are normal. Pupils are equal, round, and reactive to light.   Cardiovascular: Normal rate, regular rhythm, normal heart sounds and intact distal pulses.   Pulmonary/Chest: Effort normal and breath sounds normal.   Musculoskeletal:   See detailed exam below   Neurological: Alert and oriented to person, place, and time. No sensory deficit. Coordination normal.   Skin: Skin is warm and dry. Capillary refill takes less than 2 seconds. No rash noted. No erythema.   Psychiatric: Patient has a normal mood and affect. Her behavior is normal. Judgment and thought content normal.   Nursing note and vitals reviewed.      Ortho Exam:   Right knee (varus). Patient has crepitus throughout range of motion. Positive patellar grind test. Mild effusion. Lachman is negative. Pivot shift is negative. Anterior and posterior drawer signs are negative. Significant joint line tenderness is noted on the medial aspect of the knee. Patient has a varus orientation of the knee. There is fullness and tenderness in the Popliteal " fossa. Mild distention of a Popliteal cyst is noted in this location. Range of motion in flexion is from 0-110 degrees. Neurovascular status is intact.  Dorsalis pedis and posterior tibial artery pulses are palpable. Common peroneal nerve function is well preserved. Patient's gait is cautious and antalgic. Skin and soft tissues are mildly swollen, consistent with synovitis and effusion. The patient has a significant limp with the first few steps after starting the gait cycle. Getting out of a chair takes a lot of effort due to pain on knee flexion.         Diagnostics:  right Knee X-Ray  Indication: Evaluation of pain and discomfort on the medial aspect of the knee.  AP, Lateral views  Findings: Moderately advanced degenerative osteoarthritis of the knee with some narrowing of the medial joint space.  no bony lesion  Soft tissues within normal limits  decreased joint spaces  Hardware appropriately positioned not applicable      yes prior studies available for comparison.    This patient's x-ray report was graded according to the Kellgren and Adama classification.  This took into account the joint space narrowing, osteophyte formation, sclerosis of the distal femur/proximal tibia along with deformity of those bones.  The findings were indicative of K L grade 2.    X-RAY was ordered and reviewed by Polo Tucker MD      Assessment:  Elaina was seen today for follow-up and pain.    Diagnoses and all orders for this visit:    Pain  -     XR Knee 1 or 2 View Right    Post-traumatic osteoarthritis of right knee    Chronic pain of right ankle          Procedures  ?    Plan    · Compression/brace to support her knee function to prevent it from buckling and giving out.    · Steroid injection and Visco supplementation injections discussed with the patient  · Calcium and vitamin D for bone health.  · Recommended the use of KHLOE hose stockings to address the thrombosed vein on the medial aspect of the right leg and  knee.  · Rest, ice, compression, and elevation (RICE) therapy  · Stretching and strengthening exercises of the quads and the hamstrings.  · Glucosamine, chondroitin and turmeric for cartilage health.  · Tylenol 500-1000mg by mouth every 6 hours as needed for pain   · Follow up in 3 month(s).  · Disabled parking sticker given to the patient because she cannot walk long distances from her car to the store or offices.    Date of encounter: 06/24/2020   Polo Tucker MD

## 2020-07-31 ENCOUNTER — TRANSCRIBE ORDERS (OUTPATIENT)
Dept: ADMINISTRATIVE | Facility: HOSPITAL | Age: 65
End: 2020-07-31

## 2020-07-31 DIAGNOSIS — I73.9 PAD (PERIPHERAL ARTERY DISEASE) (HCC): Primary | ICD-10-CM

## 2020-08-06 ENCOUNTER — HOSPITAL ENCOUNTER (OUTPATIENT)
Dept: CARDIOLOGY | Facility: HOSPITAL | Age: 65
Discharge: HOME | End: 2020-08-06

## 2020-08-06 ENCOUNTER — HOSPITAL ENCOUNTER (OUTPATIENT)
Dept: CARDIOLOGY | Facility: HOSPITAL | Age: 65
Discharge: HOME | End: 2020-08-06
Admitting: INTERNAL MEDICINE

## 2020-08-06 DIAGNOSIS — I73.9 PAD (PERIPHERAL ARTERY DISEASE) (HCC): ICD-10-CM

## 2020-08-06 LAB
BH CV LOWER ARTERIAL LEFT ABI RATIO: 0.8
BH CV LOWER ARTERIAL LEFT DORSALIS PEDIS SYS MAX: 108 MMHG
BH CV LOWER ARTERIAL LEFT GREAT TOE SYS MAX: 86 MMHG
BH CV LOWER ARTERIAL LEFT HIGH THIGH SYS MAX: 125 MMHG
BH CV LOWER ARTERIAL LEFT LOW THIGH SYS MAX: 117 MMHG
BH CV LOWER ARTERIAL LEFT POPLITEAL SYS MAX: 114 MMHG
BH CV LOWER ARTERIAL LEFT POST TIBIAL SYS MAX: 110 MMHG
BH CV LOWER ARTERIAL LEFT TBI RATIO: 0.66
BH CV LOWER ARTERIAL RIGHT ABI RATIO: 0.5
BH CV LOWER ARTERIAL RIGHT DORSALIS PEDIS SYS MAX: 70 MMHG
BH CV LOWER ARTERIAL RIGHT GREAT TOE SYS MAX: 53 MMHG
BH CV LOWER ARTERIAL RIGHT HIGH THIGH SYS MAX: 99 MMHG
BH CV LOWER ARTERIAL RIGHT LOW THIGH SYS MAX: 95 MMHG
BH CV LOWER ARTERIAL RIGHT POPLITEAL SYS MAX: 65 MMHG
BH CV LOWER ARTERIAL RIGHT POST TIBIAL SYS MAX: 66 MMHG
BH CV LOWER ARTERIAL RIGHT TBI RATIO: 0.4
BH CV XLRA MEAS LEFT DIST CCA EDV: 17.5 CM/SEC
BH CV XLRA MEAS LEFT DIST CCA PSV: 75.7 CM/SEC
BH CV XLRA MEAS LEFT DIST ICA EDV: -23.4 CM/SEC
BH CV XLRA MEAS LEFT DIST ICA PSV: -98.8 CM/SEC
BH CV XLRA MEAS LEFT ICA/CCA RATIO: 1.6
BH CV XLRA MEAS LEFT MID ICA EDV: -20.8 CM/SEC
BH CV XLRA MEAS LEFT MID ICA PSV: -119.6 CM/SEC
BH CV XLRA MEAS LEFT PROX CCA EDV: 14.9 CM/SEC
BH CV XLRA MEAS LEFT PROX CCA PSV: 76.8 CM/SEC
BH CV XLRA MEAS LEFT PROX ECA EDV: 15.9 CM/SEC
BH CV XLRA MEAS LEFT PROX ECA PSV: 142.7 CM/SEC
BH CV XLRA MEAS LEFT PROX ICA EDV: -19.9 CM/SEC
BH CV XLRA MEAS LEFT PROX ICA PSV: -103.1 CM/SEC
BH CV XLRA MEAS LEFT PROX SCLA PSV: -119.1 CM/SEC
BH CV XLRA MEAS LEFT VERTEBRAL A EDV: -13 CM/SEC
BH CV XLRA MEAS LEFT VERTEBRAL A PSV: -71.1 CM/SEC
BH CV XLRA MEAS RIGHT DIST CCA EDV: 11.2 CM/SEC
BH CV XLRA MEAS RIGHT DIST CCA PSV: 70.2 CM/SEC
BH CV XLRA MEAS RIGHT DIST ICA EDV: -21.2 CM/SEC
BH CV XLRA MEAS RIGHT DIST ICA PSV: -90.2 CM/SEC
BH CV XLRA MEAS RIGHT ICA/CCA RATIO: 2
BH CV XLRA MEAS RIGHT MID ICA EDV: -25.9 CM/SEC
BH CV XLRA MEAS RIGHT MID ICA PSV: -103.6 CM/SEC
BH CV XLRA MEAS RIGHT PROX CCA EDV: 13 CM/SEC
BH CV XLRA MEAS RIGHT PROX CCA PSV: 79.5 CM/SEC
BH CV XLRA MEAS RIGHT PROX ECA EDV: 12.1 CM/SEC
BH CV XLRA MEAS RIGHT PROX ECA PSV: 148.2 CM/SEC
BH CV XLRA MEAS RIGHT PROX ICA EDV: 22.5 CM/SEC
BH CV XLRA MEAS RIGHT PROX ICA PSV: 139 CM/SEC
BH CV XLRA MEAS RIGHT PROX SCLA PSV: 244.6 CM/SEC
BH CV XLRA MEAS RIGHT VERTEBRAL A EDV: 10.5 CM/SEC
BH CV XLRA MEAS RIGHT VERTEBRAL A PSV: 70.8 CM/SEC
LEFT ARM BP: NORMAL MMHG
RIGHT ARM BP: NORMAL MMHG
UPPER ARTERIAL LEFT ARM BRACHIAL SYS MAX: 131 MMHG
UPPER ARTERIAL RIGHT ARM BRACHIAL SYS MAX: 127 MMHG

## 2020-08-06 PROCEDURE — 93880 EXTRACRANIAL BILAT STUDY: CPT

## 2020-08-06 PROCEDURE — 93923 UPR/LXTR ART STDY 3+ LVLS: CPT

## 2020-09-23 ENCOUNTER — OFFICE VISIT (OUTPATIENT)
Dept: ORTHOPEDIC SURGERY | Facility: CLINIC | Age: 65
End: 2020-09-23

## 2020-09-23 VITALS — WEIGHT: 133 LBS | BODY MASS INDEX: 22.71 KG/M2 | HEIGHT: 64 IN | TEMPERATURE: 98.2 F

## 2020-09-23 DIAGNOSIS — M25.561 CHRONIC PAIN OF RIGHT KNEE: ICD-10-CM

## 2020-09-23 DIAGNOSIS — Z87.81 H/O FRACTURE OF ANKLE: Primary | ICD-10-CM

## 2020-09-23 DIAGNOSIS — G89.29 CHRONIC PAIN OF RIGHT KNEE: ICD-10-CM

## 2020-09-23 PROCEDURE — 99213 OFFICE O/P EST LOW 20 MIN: CPT | Performed by: ORTHOPAEDIC SURGERY

## 2020-09-23 NOTE — PROGRESS NOTES
FOLLOW UP VISIT    Patient: Elaina Molina  ?  YOB: 1955    MRN: [id number]  ?  Chief Complaint   Patient presents with   • Right Knee - Follow-up, Pain      ?  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  These injuries were sustained during the course of her job.  She states that she has difficulty in abduction of the hip and difficulty in walking prolonged distances.  She has been diagnosed with vascular claudication and is now being seen by Dr. Herrera who is a vascular surgeon.  The patient states that she does have symptoms of poor vascular supply and would like to be evaluated and treated for that condition.  I certainly agree that at this point her orthopedic condition is possibly secondary to poor vascular inflow into the lower extremity.      Pain Location: RIGHT knee and RIGHT ankle  Radiation: none  Quality: dull, aching  Intensity/Severity: moderate  Duration: Several months  Onset quality: gradual   Timing: intermittent  Aggravating Factors: kneeling, rising after sitting, squatting  Alleviating Factors: NSAIDs, steroid injection (intra-articular)  Previous Episodes: yes  Associated Symptoms: pain, swelling  ADLs Affected: ambulating  Previous Treatment: Anti-inflammatory medication.    This patient is an established patient.  This problem is not new to this examiner.      Allergies:   Allergies   Allergen Reactions   • Codeine Nausea And Vomiting       Medications:   Home Medications:  Current Outpatient Medications on File Prior to Visit   Medication Sig   • amLODIPine (NORVASC) 10 MG tablet daily.   • Ascorbic Acid (VITAMIN C PO) Take  by mouth.   • aspirin 81 MG EC tablet Take 81 mg by mouth daily.   • atorvastatin (LIPITOR) 20 MG tablet Take 20 mg by mouth Daily.   • Calcium Carb-Cholecalciferol (CALCIUM 1000 + D) 1000-800 MG-UNIT tablet Take  by mouth.   • calcium carbonate-vitamin d 600-400  MG-UNIT per tablet Take 2 tablets by mouth Daily.   • CARTIA  MG 24 hr capsule    • cholecalciferol (VITAMIN D3) 1000 UNITS tablet Take 1,000 Units by mouth daily.   • cyclobenzaprine (FLEXERIL) 10 MG tablet Take 1 tablet by mouth 3 (Three) Times a Day As Needed for Muscle Spasms.   • ferrous sulfate 325 (65 FE) MG tablet Take 325 mg by mouth Daily With Breakfast.   • Multiple Vitamins-Minerals (MULTIVITAL) tablet Take 1 tablet by mouth Daily.   • omeprazole (prilOSEC) 10 MG capsule Take 10 mg by mouth Daily.   • omeprazole (PriLOSEC) 40 MG capsule daily.   • pentoxifylline (TRENtal) 400 MG CR tablet TAKE 1 TABLET PO EVERY 8 HOURS FOR LEG PAIN   • Pyridoxine HCl (VITAMIN B-6 PO) Take  by mouth.     No current facility-administered medications on file prior to visit.      Current Medications:  Scheduled Meds:  PRN Meds:.    I have reviewed the patient's medical history in detail and updated the computerized patient record.  Review and summarization of old records include:    Past Medical History:   Diagnosis Date   • Celiac disease    • Cervical cancer (CMS/HCC)    • GERD (gastroesophageal reflux disease)    • Hypertension      Past Surgical History:   Procedure Laterality Date   • CATARACT EXTRACTION     • CHOLECYSTECTOMY     • COLONOSCOPY     • COLONOSCOPY N/A 6/23/2016    Procedure: COLONOSCOPY to cecum ;  Surgeon: Yan Askew MD;  Location: [facility] ENDOSCOPY;  Service:    • ENDOSCOPY N/A 6/23/2016    Procedure: ESOPHAGOGASTRODUODENOSCOPY with biopsy;  Surgeon: Yan Askew MD;  Location: [facility] ENDOSCOPY;  Service:    • HYSTERECTOMY       Social History     Occupational History   • Not on file   Tobacco Use   • Smoking status: Former Smoker   • Smokeless tobacco: Never Used   Substance and Sexual Activity   • Alcohol use: No   • Drug use: No   • Sexual activity: Defer      History reviewed. No pertinent family history.      Review of Systems   Constitutional: Negative.  Negative for fever.   Eyes:  "Negative.    Respiratory: Negative.    Cardiovascular: Negative.    Endocrine: Negative.    Musculoskeletal: Positive for arthralgias, gait problem and joint swelling.   Skin: Negative.  Negative for rash and wound.   Allergic/Immunologic: Negative.    Neurological: Negative for numbness.   Hematological: Negative.    Psychiatric/Behavioral: Negative.           Wt Readings from Last 3 Encounters:   09/23/20 60.3 kg (133 lb)   06/24/20 58.9 kg (129 lb 12.8 oz)   12/18/19 58.1 kg (128 lb)     Ht Readings from Last 3 Encounters:   09/23/20 162.6 cm (64\")   06/24/20 162.6 cm (64\")   12/18/19 156.2 cm (61.5\")     Body mass index is 22.83 kg/m².  Facility age limit for growth percentiles is 20 years.  Vitals:    09/23/20 1336   Temp: 98.2 °F (36.8 °C)         Physical Exam  Constitutional: Patient is oriented to person, place, and time. Appears well-developed and well-nourished.   HENT:   Head: Normocephalic and atraumatic.   Eyes: Conjunctivae and EOM are normal. Pupils are equal, round, and reactive to light.   Cardiovascular: Normal rate, regular rhythm, normal heart sounds and intact distal pulses.   Pulmonary/Chest: Effort normal and breath sounds normal.   Musculoskeletal:   See detailed exam below   Neurological: Alert and oriented to person, place, and time. No sensory deficit. Coordination normal.   Skin: Skin is warm and dry. Capillary refill takes less than 2 seconds. No rash noted. No erythema.   Psychiatric: Patient has a normal mood and affect. Her behavior is normal. Judgment and thought content normal.   Nursing note and vitals reviewed.      Ortho Exam:   Right knee (varus). Patient has crepitus throughout range of motion. Positive patellar grind test. Mild effusion. Lachman is negative. Pivot shift is negative. Anterior and posterior drawer signs are negative. Significant joint line tenderness is noted on the medial aspect of the knee. Patient has a varus orientation of the knee. There is fullness and " tenderness in the Popliteal fossa. Mild distention of a Popliteal cyst is noted in this location. Range of motion in flexion is from 0-110 degrees. Neurovascular status is intact.  Dorsalis pedis and posterior tibial artery pulses are palpable. Common peroneal nerve function is well preserved. Patient's gait is cautious and antalgic. Skin and soft tissues are mildly swollen, consistent with synovitis and effusion. The patient has a significant limp with the first few steps after starting the gait cycle. Getting out of a chair takes a lot of effort due to pain on knee flexion.         Diagnostics:  no diagnostic testing performed this visit      Assessment:  Elaina was seen today for follow-up and pain.    Diagnoses and all orders for this visit:    H/O fracture of ankle    Chronic pain of right knee          Procedures  ?    Plan    · Compression/brace to the knee to prevent it from buckling and giving her.  · Intra-articular steroid injection and Visco supplementation injections discussed with the patient.  · Calcium and vitamin D for bone health.  · I strongly recommend that she should be seen and evaluated by vascular surgeon to consider either angiography or catheterization and even reconstruction of the peripheral vascular disease.  · There is no indication for steroid injection today and she is not a candidate for knee replacement surgery at this point.  · Rest, ice, compression, and elevation (RICE) therapy  · Stretching and strengthening exercises of the quads and the hamstrings.  · Tylenol 500-1000mg by mouth every 6 hours as needed for pain   · Follow up in 3 month(s)    Date of encounter: 09/23/2020   Polo Tucker MD

## 2021-01-27 ENCOUNTER — OFFICE VISIT (OUTPATIENT)
Dept: ORTHOPEDIC SURGERY | Facility: CLINIC | Age: 66
End: 2021-01-27

## 2021-01-27 VITALS — HEIGHT: 64 IN | WEIGHT: 130 LBS | TEMPERATURE: 98 F | BODY MASS INDEX: 22.2 KG/M2

## 2021-01-27 DIAGNOSIS — M25.571 CHRONIC PAIN OF RIGHT ANKLE: ICD-10-CM

## 2021-01-27 DIAGNOSIS — G89.29 CHRONIC PAIN OF RIGHT KNEE: ICD-10-CM

## 2021-01-27 DIAGNOSIS — M25.561 CHRONIC PAIN OF RIGHT KNEE: ICD-10-CM

## 2021-01-27 DIAGNOSIS — R52 PAIN: Primary | ICD-10-CM

## 2021-01-27 DIAGNOSIS — G89.29 CHRONIC PAIN OF RIGHT ANKLE: ICD-10-CM

## 2021-01-27 PROCEDURE — 73610 X-RAY EXAM OF ANKLE: CPT | Performed by: ORTHOPAEDIC SURGERY

## 2021-01-27 PROCEDURE — 73560 X-RAY EXAM OF KNEE 1 OR 2: CPT | Performed by: ORTHOPAEDIC SURGERY

## 2021-01-27 PROCEDURE — 99213 OFFICE O/P EST LOW 20 MIN: CPT | Performed by: ORTHOPAEDIC SURGERY

## 2021-01-27 RX ORDER — CLOPIDOGREL BISULFATE 75 MG/1
TABLET ORAL DAILY
COMMUNITY
Start: 2020-12-03 | End: 2021-08-19 | Stop reason: SDUPTHER

## 2021-01-27 RX ORDER — ATORVASTATIN CALCIUM 20 MG/1
TABLET, FILM COATED ORAL
COMMUNITY
Start: 2020-12-06 | End: 2021-08-19 | Stop reason: SDUPTHER

## 2021-01-27 RX ORDER — PRAMIPEXOLE DIHYDROCHLORIDE 0.12 MG/1
0.12 TABLET ORAL EVERY EVENING
COMMUNITY
Start: 2021-01-11

## 2021-01-27 RX ORDER — AMLODIPINE BESYLATE 5 MG/1
TABLET ORAL
COMMUNITY
Start: 2020-12-06 | End: 2021-08-19 | Stop reason: SDUPTHER

## 2021-01-28 NOTE — PROGRESS NOTES
"Chief Complaint  Follow-up of the Right Knee and the right ankle.    Subjective    History of Present Illness      Elaina Molina is a 65 y.o. female who presents to [facility] for persistent pain and discomfort on the right knee.  She has a progressive valgus deformity.  She has difficulty going up and down the steps.  She also had an ankle fracture which has subsequently healed.  Her gait pattern alterations did make changes and led to her impaired mobility.  I had recommended that she see a vascular surgeon.  She did undergo lower extremity vascular reconstruction procedures in December 2020.  This led to the placement of stents which has subsequently improved her circulation tremendously.  The patient is very pleased with the fact that she can now walk extensive distances without as much pain as she had initially.  She is very grateful for the vascular surgical intervention.  Her knee does continue to bother her and we have discussed nonoperative management for as long as possible.  She will eventually need knee replacement surgery.  History of Present Illness   Pain Location:  RIGHT knee  Radiation: none  Quality: dull, aching  Intensity/Severity: moderate  Duration: Several months  Progression of symptoms: no worsening, symptoms stable/unchanged  Onset quality: gradual   Timing: intermittent  Aggravating Factors: rising after sitting, squatting  Alleviating Factors: NSAIDs  Previous Episodes: yes  Associated Symptoms: pain, swelling  ADLs Affected: ambulating, recreational activities/sports  Previous Treatment: NSAIDs and brace       Objective   Vital Signs:   Temp 98 °F (36.7 °C)   Ht 162.6 cm (64\")   Wt 59 kg (130 lb)   BMI 22.31 kg/m²     Physical Exam  Physical Exam  Vitals signs and nursing note reviewed.   Constitutional:       Appearance: Normal appearance.   Pulmonary:      Effort: Pulmonary effort is normal.   Skin:     General: Skin is " warm and dry.      Capillary Refill: Capillary refill takes less than 2 seconds.   Neurological:      General: No focal deficit present.      Mental Status: He is alert and oriented to person, place, and time. Mental status is at baseline.   Psychiatric:         Mood and Affect: Mood normal.         Behavior: Behavior normal.         Thought Content: Thought content normal.         Judgment: Judgment normal.     Ortho Exam   Right knee (valgus). Positive grind test. Pain and tenderness is present over the lateral aspect of the knee. Patient has some tenderness and irritability of the common peroneal nerve. Lateral joint line is exquisitely painful and tender. Patella is tending to track a little bit laterally. There is thickening of the synovial membrane. Range of motion in flexion is 0-110 degrees. Dorsalis pedis and posterior tibial artery pulses are palpable. Common peroneal nerve function is well preserved. Gait is cautious and antalgic. The patient has valgus orientation of the knee with a higher degree of external rotation than the contralateral side.There is fullness and tenderness in the Popliteal fossa. Getting out of a seated position is painful for the patient.     Right ankle: The distal fibular fracture has completely healed.  Ankle mortise is stable.  External rotation and squeeze tests are negative.  Dorsiflexion is 0 to 10 degrees and plantarflexion is 0 to 20 degrees.  She is able to circumduct the ankle without any difficulty.  Skin and soft tissue swelling has completely resolved.        Result Review :{ Labs  Result Review  Imaging  Med Tab  Media :23}   The following data was reviewed by: Polo Tucker MD on 01/27/2021:  Data reviewed: Radiologic studies X-rays of the knee and the ankle are ordered and reviewed by me today.     xrays obtained today  right Knee X-Ray  Indication: Evaluation of pain and discomfort of the knee.  AP, Lateral views  Findings: Early advanced valgus DJD of the knee  with narrowing of the lateral joint space and osteophyte formation.  no bony lesion  Soft tissues within normal limits  decreased joint spaces  Hardware appropriately positioned not applicable      yes prior studies available for comparison.    This patient's x-ray report was graded according to the Kellgren and Adama classification.  This took into account the joint space narrowing, osteophyte formation, sclerosis of the distal femur/proximal tibia along with deformity of those bones.  The findings were indicative of K L grade 3.    X-RAY was ordered and reviewed by Polo Tucker MD    right Ankle X-Ray  Indication: Evaluation of distal fibular fracture.  Views: AP, Lateral  Findings: Healed fracture of the distal fibula.  yes fracture  no bony lesion  Soft tissues within normal limits  within normal limits joint spaces  Hardware appropriately positioned not applicable    yes prior studies available for comparison.    X-RAY was ordered and reviewed by Polo Tucker MD        Procedures           Assessment   Assessment and Plan    Problem List Items Addressed This Visit        Other    Chronic pain of right ankle    Chronic pain of right knee      Other Visit Diagnoses     Pain    -  Primary    Relevant Orders    XR Knee 1 or 2 View Right (Completed)    XR Ankle 3+ View Right (Completed)          Follow Up   · Compression/brace the knee to prevent it from buckling giving out.  · Intra-articular steroid injection and viscosupplementation injections discussed with the patient at length.  · Calcium and vitamin D for bone health.  · Aerobic exercises discussed with the patient.  · She will eventually need knee replacement surgery and she will let me know when she is ready for that form of intervention based on symptom progression.  · Rest, ice, compression, and elevation (RICE) therapy  · Stretching and strengthening exercises of the quads and the hamstrings.  · OTC Tylenol 500-1000mg by mouth every 6 hours as  needed for pain   · Follow up in 6 month(s)  • Patient was given instructions and counseling regarding her condition or for health maintenance advice. Please see specific information pulled into the AVS if appropriate.     Polo Tucker MD   Date of Encounter: 1/27/2021   Electronically signed by Polo Tucker MD, 01/28/21, 1:10 PM EST.     EMR Dragon/Transcription disclaimer:  Much of this encounter note is an electronic transcription/translation of spoken language to printed text. The electronic translation of spoken language may permit erroneous, or at times, nonsensical words or phrases to be inadvertently transcribed; Although I have reviewed the note for such errors, some may still exist.

## 2021-03-16 ENCOUNTER — BULK ORDERING (OUTPATIENT)
Dept: CASE MANAGEMENT | Facility: OTHER | Age: 66
End: 2021-03-16

## 2021-03-16 DIAGNOSIS — Z23 IMMUNIZATION DUE: ICD-10-CM

## 2021-06-25 ENCOUNTER — APPOINTMENT (OUTPATIENT)
Dept: WOMENS IMAGING | Facility: HOSPITAL | Age: 66
End: 2021-06-25

## 2021-06-25 PROCEDURE — 77063 BREAST TOMOSYNTHESIS BI: CPT | Performed by: RADIOLOGY

## 2021-06-25 PROCEDURE — 77067 SCR MAMMO BI INCL CAD: CPT | Performed by: RADIOLOGY

## 2021-08-19 ENCOUNTER — OFFICE VISIT (OUTPATIENT)
Dept: ORTHOPEDIC SURGERY | Facility: CLINIC | Age: 66
End: 2021-08-19

## 2021-08-19 VITALS — HEIGHT: 64 IN | TEMPERATURE: 96.4 F | WEIGHT: 130 LBS | BODY MASS INDEX: 22.2 KG/M2

## 2021-08-19 DIAGNOSIS — M25.561 CHRONIC PAIN OF RIGHT KNEE: Primary | ICD-10-CM

## 2021-08-19 DIAGNOSIS — G89.29 CHRONIC PAIN OF RIGHT KNEE: Primary | ICD-10-CM

## 2021-08-19 DIAGNOSIS — G89.29 CHRONIC PAIN OF RIGHT ANKLE: ICD-10-CM

## 2021-08-19 DIAGNOSIS — M25.571 CHRONIC PAIN OF RIGHT ANKLE: ICD-10-CM

## 2021-08-19 PROCEDURE — 99213 OFFICE O/P EST LOW 20 MIN: CPT | Performed by: ORTHOPAEDIC SURGERY

## 2021-08-19 RX ORDER — ALBUTEROL SULFATE 90 UG/1
2 AEROSOL, METERED RESPIRATORY (INHALATION) EVERY 4 HOURS PRN
COMMUNITY
Start: 2021-04-27

## 2021-08-19 RX ORDER — AMLODIPINE BESYLATE 5 MG/1
5 TABLET ORAL EVERY EVENING
COMMUNITY
Start: 2021-06-24

## 2021-08-19 RX ORDER — DIPHENOXYLATE HYDROCHLORIDE AND ATROPINE SULFATE 2.5; .025 MG/1; MG/1
TABLET ORAL
COMMUNITY
End: 2021-10-26

## 2021-08-19 RX ORDER — ATORVASTATIN CALCIUM 20 MG/1
20 TABLET, FILM COATED ORAL NIGHTLY
COMMUNITY
Start: 2021-06-24

## 2021-08-19 RX ORDER — CLOPIDOGREL BISULFATE 75 MG/1
75 TABLET ORAL EVERY EVENING
COMMUNITY
Start: 2021-08-18

## 2021-08-19 NOTE — PROGRESS NOTES
"Chief Complaint  Follow-up of the Right Ankle and Follow-up of the Right Knee    Subjective    History of Present Illness      Elaina Molina is a 66 y.o. female who presents to [facility] for follow-up on a Workmen's Comp. related injury to the right fibula and right knee.  History of Present Illness the patient presents to the office with a longstanding history of a Workmen's Compensation injury that led to a right distal fibular fracture.  This was treated conservatively and nonoperatively.  She has done very well as far as the fracture healing is concerned.  There is no clinical deformity.  She does not have any instability of the ankle joint as such.  She is quite frail and walks slowly and protect the lower extremity when she walks.  She has also developed posttraumatic osteoarthritis of the right knee.  She states that the knee symptoms are tolerable at this point.  She will eventually need knee replacement surgery.  The patient states that she would like to defer knee replacement surgery as long as possible and I definitely agree with that.  Pain Location:  RIGHT knee and  RIGHT ankle  Radiation: none  Quality: dull, aching  Intensity/Severity: mild-moderate  Duration: several months  Progression of symptoms: no worsening, symptoms stable/unchanged  Onset quality: gradual   Timing: intermittent  Aggravating Factors: going up and down stairs, rising after sitting, squatting  Alleviating Factors: NSAIDs  Previous Episodes: yes  Associated Symptoms: pain, swelling  ADLs Affected: ambulating, recreational activities/sports  Previous Treatment: NSAIDs and brace       Objective   Vital Signs:   Temp 96.4 °F (35.8 °C)   Ht 162.6 cm (64\")   Wt 59 kg (130 lb)   BMI 22.31 kg/m²     Physical Exam  Physical Exam  Vitals signs and nursing note reviewed.   Constitutional:       Appearance: Normal appearance.   Pulmonary:      Effort: Pulmonary effort is normal.   Skin:     General: Skin " is warm and dry.      Capillary Refill: Capillary refill takes less than 2 seconds.   Neurological:      General: No focal deficit present.      Mental Status: He is alert and oriented to person, place, and time. Mental status is at baseline.   Psychiatric:         Mood and Affect: Mood normal.         Behavior: Behavior normal.         Thought Content: Thought content normal.         Judgment: Judgment normal.     Ortho Exam   Right knee (varus). Patient has crepitus throughout range of motion. Positive patellar grind test. Mild effusion. Lachman is negative. Pivot shift is negative. Anterior and posterior drawer signs are negative. Significant joint line tenderness is noted on the medial aspect of the knee. Patient has a varus orientation of the knee. There is fullness and tenderness in the Popliteal fossa. Mild distention of a Popliteal cyst is noted in this location. Range of motion in flexion is from 0-110 degrees. Neurovascular status is intact.  Dorsalis pedis and posterior tibial artery pulses are palpable. Common peroneal nerve function is well preserved. Patient's gait is cautious and antalgic. Skin and soft tissues are mildly swollen, consistent with synovitis and effusion. The patient has a significant limp with the first few steps after starting the gait cycle. Getting out of a chair takes a lot of effort due to pain on knee flexion.   Right ankle: The ankle mortise is stable.  External rotation and squeeze tests are both negative.  Dorsiflexion 0 to 10 degrees.  Plantarflexion is 0 to 30 degrees.  The syndesmosis is stable.  Calf is soft nontender.  Homans' sign is negative.  There is no evidence of a DVT.  Her gait is cautious but otherwise completely normal.  Tenderness over the fibular fracture site has completely resolved.        Result Review :   The following data was reviewed by: Polo Tucker MD on 08/19/2021:  Radiologic studies - see below for interpretation  xrays to be obtained at next  visit            Procedures           Assessment   Assessment and Plan    Diagnoses and all orders for this visit:    1. Chronic pain of right knee (Primary)    2. Chronic pain of right ankle          Follow Up   · Compression/brace to the knee and to the ankle to prevent them from buckling and giving her.  · Calcium and vitamin D for bone health discussed with the patient at length.  · Reinjury and falls precautions discussed with the patient.  · Intra-articular steroid injection and viscosupplementation injections discussed and offered to the patient for knee symptoms.  She will let me know when she is ready for that form of intervention.  · Rest, ice, compression, and elevation (RICE) therapy  · Stretching and strengthening exercises of the quads and the hamstrings.  · OTC Alternate Ibuprofen and Tylenol as needed   · Patient will eventually need a right total knee arthroplasty based on symptom progression.  · Follow up in 6 month(s)  • Patient was given instructions and counseling regarding her condition or for health maintenance advice. Please see specific information pulled into the AVS if appropriate.     oPlo Tucker MD   Date of Encounter: 8/19/2021        EMR Dragon/Transcription disclaimer:  Much of this encounter note is an electronic transcription/translation of spoken language to printed text. The electronic translation of spoken language may permit erroneous, or at times, nonsensical words or phrases to be inadvertently transcribed; Although I have reviewed the note for such errors, some may still exist.

## 2021-10-26 ENCOUNTER — PRE-ADMISSION TESTING (OUTPATIENT)
Dept: PREADMISSION TESTING | Facility: HOSPITAL | Age: 66
End: 2021-10-26

## 2021-10-26 VITALS
BODY MASS INDEX: 22.49 KG/M2 | HEIGHT: 65 IN | SYSTOLIC BLOOD PRESSURE: 144 MMHG | WEIGHT: 135 LBS | OXYGEN SATURATION: 98 % | DIASTOLIC BLOOD PRESSURE: 70 MMHG | RESPIRATION RATE: 16 BRPM | HEART RATE: 97 BPM | TEMPERATURE: 97.9 F

## 2021-10-26 LAB
ANION GAP SERPL CALCULATED.3IONS-SCNC: 12.6 MMOL/L (ref 5–15)
BUN SERPL-MCNC: 12 MG/DL (ref 8–23)
BUN/CREAT SERPL: 17.1 (ref 7–25)
CALCIUM SPEC-SCNC: 10.2 MG/DL (ref 8.6–10.5)
CHLORIDE SERPL-SCNC: 108 MMOL/L (ref 98–107)
CO2 SERPL-SCNC: 21.4 MMOL/L (ref 22–29)
CREAT SERPL-MCNC: 0.7 MG/DL (ref 0.57–1)
DEPRECATED RDW RBC AUTO: 45.6 FL (ref 37–54)
ERYTHROCYTE [DISTWIDTH] IN BLOOD BY AUTOMATED COUNT: 12.6 % (ref 12.3–15.4)
GFR SERPL CREATININE-BSD FRML MDRD: 83 ML/MIN/1.73
GLUCOSE SERPL-MCNC: 101 MG/DL (ref 65–99)
HCT VFR BLD AUTO: 41.3 % (ref 34–46.6)
HGB BLD-MCNC: 13.4 G/DL (ref 12–15.9)
MCH RBC QN AUTO: 31.9 PG (ref 26.6–33)
MCHC RBC AUTO-ENTMCNC: 32.4 G/DL (ref 31.5–35.7)
MCV RBC AUTO: 98.3 FL (ref 79–97)
PLATELET # BLD AUTO: 218 10*3/MM3 (ref 140–450)
PMV BLD AUTO: 11.3 FL (ref 6–12)
POTASSIUM SERPL-SCNC: 5.5 MMOL/L (ref 3.5–5.2)
QT INTERVAL: 387 MS
RBC # BLD AUTO: 4.2 10*6/MM3 (ref 3.77–5.28)
SARS-COV-2 ORF1AB RESP QL NAA+PROBE: NOT DETECTED
SODIUM SERPL-SCNC: 142 MMOL/L (ref 136–145)
WBC # BLD AUTO: 6.98 10*3/MM3 (ref 3.4–10.8)

## 2021-10-26 PROCEDURE — 93005 ELECTROCARDIOGRAM TRACING: CPT

## 2021-10-26 PROCEDURE — 80048 BASIC METABOLIC PNL TOTAL CA: CPT

## 2021-10-26 PROCEDURE — 93010 ELECTROCARDIOGRAM REPORT: CPT | Performed by: INTERNAL MEDICINE

## 2021-10-26 PROCEDURE — 85027 COMPLETE CBC AUTOMATED: CPT

## 2021-10-26 PROCEDURE — C9803 HOPD COVID-19 SPEC COLLECT: HCPCS | Performed by: NURSE PRACTITIONER

## 2021-10-26 PROCEDURE — 36415 COLL VENOUS BLD VENIPUNCTURE: CPT

## 2021-10-26 PROCEDURE — U0004 COV-19 TEST NON-CDC HGH THRU: HCPCS | Performed by: NURSE PRACTITIONER

## 2021-10-26 RX ORDER — MULTIPLE VITAMINS W/ MINERALS TAB 9MG-400MCG
1 TAB ORAL EVERY EVENING
COMMUNITY
End: 2021-10-28 | Stop reason: HOSPADM

## 2021-10-26 RX ORDER — PHENOL 1.4 %
1200 AEROSOL, SPRAY (ML) MUCOUS MEMBRANE DAILY
COMMUNITY

## 2021-10-26 NOTE — DISCHARGE INSTRUCTIONS
Take the following medications the morning of surgery:    NONE    If you are on prescription narcotic pain medication to control your pain you may also take that medication the morning of surgery.    General Instructions:  • Do not eat solid food after midnight the night before surgery.  • You may drink clear liquids day of surgery but must stop at least one hour before your hospital arrival time.  It is beneficial for you to have a clear drink that contains carbohydrates the day of surgery.  We suggest a 12 to 20 ounce bottle of Gatorade or Powerade for non-diabetic patients       Clear liquids are liquids you can see through.  Nothing red in color.     Plain water                               Sports drinks  Sodas                                   Gelatin (Jell-O)  Fruit juices without pulp such as white grape juice and apple juice  Popsicles that contain no fruit or yogurt  Tea or coffee (no cream or milk added)  Gatorade / Powerade  G2 / Powerade Zero    •   • Bring any papers given to you in the doctor’s office.  • Wear clean comfortable clothes.  • Do not wear contact lenses, false eyelashes or make-up.  Bring a case for your glasses.   • Remove all piercings.  Leave jewelry and any other valuables at home.  • Hair extensions with metal clips must be removed prior to surgery.  • The Pre-Admission Testing nurse will instruct you to bring medications if unable to obtain an accurate list in Pre-Admission Testing.    • REPORT TO SURGERY ENTRANCE ON  10- AT 0900 AM          Preventing a Surgical Site Infection:  • For 2 to 3 days before surgery, avoid shaving with a razor because the razor can irritate skin and make it easier to develop an infection.    • Any areas of open skin can increase the risk of a post-operative wound infection by allowing bacteria to enter and travel throughout the body.  Notify your surgeon if you have any skin wounds / rashes even if it is not near the expected surgical site.  The  area will need assessed to determine if surgery should be delayed until it is healed.  • The night prior to surgery shower using a fresh bar of anti-bacterial soap (such as Dial) and clean washcloth.  Sleep in a clean bed with clean clothing.  Do not allow pets to sleep with you.  • Shower on the morning of surgery using a fresh bar of anti-bacterial soap (such as Dial) and clean washcloth.  Dry with a clean towel and dress in clean clothing.  • Ask your surgeon if you will be receiving antibiotics prior to surgery.  • Make sure you, your family, and all healthcare providers clean their hands with soap and water or an alcohol based hand  before caring for you or your wound.    Day of surgery:  Your arrival time is approximately two hours before your scheduled surgery time.  Upon arrival, a Pre-op nurse and Anesthesiologist will review your health history, obtain vital signs, and answer questions you may have.  The only belongings needed at this time will be a list of your home medications and if applicable your C-PAP/BI-PAP machine.  A Pre-op nurse will start an IV and you may receive medication in preparation for surgery, including something to help you relax.     Please be aware that surgery does come with discomfort.  We want to make every effort to control your discomfort so please discuss any uncontrolled symptoms with your nurse.   Your doctor will most likely have prescribed pain medications.      If you are going home after surgery you will receive individualized written care instructions before being discharged.  A responsible adult must drive you to and from the hospital on the day of your surgery and stay with you for 24 hours.  Discharge prescriptions can be filled by the hospital pharmacy during regular pharmacy hours.  If you are having surgery late in the day/evening your prescription may be e-prescribed to your pharmacy.  Please verify your pharmacy hours or chose a 24 hour pharmacy to avoid  not having access to your prescription because your pharmacy has closed for the day.         If you have any questions please call Pre-Admission Testing at [phone].  Deductibles and co-payments are collected on the day of service. Please be prepared to pay the required co-pay, deductible or deposit on the day of service as defined by your plan.    Patient Education for Self-Quarantine Process    • Following your COVID testing, we strongly recommend that you wear a mask when you are with other people and practice social distancing.   • Limit your activities to only required outings.  • Wash your hands with soap and water frequently for at least 20 seconds.   • Avoid touching your eyes, nose and mouth with unwashed hands.  • Do not share anything - utensils, drinking glasses, food from the same bowl.   • Sanitize household surfaces daily. Include all high touch areas (door handles, light switches, phones, countertops, etc.)    Call your surgeon immediately if you experience any of the following symptoms:  • Sore Throat  • Shortness of Breath or difficulty breathing  • Cough  • Chills  • Body soreness or muscle pain  • Headache  • Fever  • New loss of taste or smell  • Do not arrive for your surgery ill.  Your procedure will need to be rescheduled to another time.  You will need to call your physician before the day of surgery to avoid any unnecessary exposure to hospital staff as well as other patients.

## 2021-10-28 ENCOUNTER — HOSPITAL ENCOUNTER (OUTPATIENT)
Facility: HOSPITAL | Age: 66
Setting detail: HOSPITAL OUTPATIENT SURGERY
Discharge: HOME | End: 2021-10-28
Attending: OPHTHALMOLOGY | Admitting: OPHTHALMOLOGY

## 2021-10-28 ENCOUNTER — ANESTHESIA EVENT (OUTPATIENT)
Dept: PERIOP | Facility: HOSPITAL | Age: 66
End: 2021-10-28

## 2021-10-28 ENCOUNTER — ANESTHESIA (OUTPATIENT)
Dept: PERIOP | Facility: HOSPITAL | Age: 66
End: 2021-10-28

## 2021-10-28 VITALS
SYSTOLIC BLOOD PRESSURE: 129 MMHG | TEMPERATURE: 98 F | HEART RATE: 81 BPM | DIASTOLIC BLOOD PRESSURE: 63 MMHG | RESPIRATION RATE: 16 BRPM | OXYGEN SATURATION: 95 %

## 2021-10-28 PROCEDURE — 25010000002 DEXAMETHASONE PER 1 MG: Performed by: NURSE ANESTHETIST, CERTIFIED REGISTERED

## 2021-10-28 PROCEDURE — 25010000002 FENTANYL CITRATE (PF) 50 MCG/ML SOLUTION: Performed by: NURSE ANESTHETIST, CERTIFIED REGISTERED

## 2021-10-28 PROCEDURE — 25010000002 ONDANSETRON PER 1 MG: Performed by: NURSE ANESTHETIST, CERTIFIED REGISTERED

## 2021-10-28 PROCEDURE — 25010000002 PROPOFOL 10 MG/ML EMULSION: Performed by: NURSE ANESTHETIST, CERTIFIED REGISTERED

## 2021-10-28 PROCEDURE — 25010000002 PHENYLEPHRINE 10 MG/ML SOLUTION: Performed by: NURSE ANESTHETIST, CERTIFIED REGISTERED

## 2021-10-28 RX ORDER — ONDANSETRON 2 MG/ML
4 INJECTION INTRAMUSCULAR; INTRAVENOUS ONCE AS NEEDED
Status: DISCONTINUED | OUTPATIENT
Start: 2021-10-28 | End: 2021-10-28 | Stop reason: HOSPADM

## 2021-10-28 RX ORDER — HYDROCODONE BITARTRATE AND ACETAMINOPHEN 5; 325 MG/1; MG/1
1 TABLET ORAL EVERY 6 HOURS PRN
Qty: 15 TABLET | Refills: 0 | Status: SHIPPED | OUTPATIENT
Start: 2021-10-28 | End: 2021-10-31

## 2021-10-28 RX ORDER — OXYCODONE AND ACETAMINOPHEN 10; 325 MG/1; MG/1
1 TABLET ORAL EVERY 4 HOURS PRN
Status: DISCONTINUED | OUTPATIENT
Start: 2021-10-28 | End: 2021-10-28 | Stop reason: HOSPADM

## 2021-10-28 RX ORDER — TRAMADOL HYDROCHLORIDE 50 MG/1
50 TABLET ORAL EVERY 6 HOURS PRN
Qty: 20 TABLET | Refills: 0 | Status: SHIPPED | OUTPATIENT
Start: 2021-10-28 | End: 2022-10-28

## 2021-10-28 RX ORDER — MAGNESIUM HYDROXIDE 1200 MG/15ML
LIQUID ORAL AS NEEDED
Status: DISCONTINUED | OUTPATIENT
Start: 2021-10-28 | End: 2021-10-28 | Stop reason: HOSPADM

## 2021-10-28 RX ORDER — LABETALOL HYDROCHLORIDE 5 MG/ML
5 INJECTION, SOLUTION INTRAVENOUS
Status: DISCONTINUED | OUTPATIENT
Start: 2021-10-28 | End: 2021-10-28 | Stop reason: HOSPADM

## 2021-10-28 RX ORDER — NALOXONE HCL 0.4 MG/ML
0.2 VIAL (ML) INJECTION AS NEEDED
Status: DISCONTINUED | OUTPATIENT
Start: 2021-10-28 | End: 2021-10-28 | Stop reason: HOSPADM

## 2021-10-28 RX ORDER — DIPHENHYDRAMINE HCL 25 MG
25 CAPSULE ORAL
Status: DISCONTINUED | OUTPATIENT
Start: 2021-10-28 | End: 2021-10-28 | Stop reason: HOSPADM

## 2021-10-28 RX ORDER — PROMETHAZINE HYDROCHLORIDE 25 MG/1
25 TABLET ORAL ONCE AS NEEDED
Status: DISCONTINUED | OUTPATIENT
Start: 2021-10-28 | End: 2021-10-28 | Stop reason: HOSPADM

## 2021-10-28 RX ORDER — AMLODIPINE BESYLATE 5 MG/1
1 TABLET ORAL DAILY
Status: ON HOLD | COMMUNITY
Start: 2021-08-31 | End: 2021-10-28

## 2021-10-28 RX ORDER — SODIUM CHLORIDE 0.9 % (FLUSH) 0.9 %
10 SYRINGE (ML) INJECTION EVERY 12 HOURS SCHEDULED
Status: DISCONTINUED | OUTPATIENT
Start: 2021-10-28 | End: 2021-10-28 | Stop reason: HOSPADM

## 2021-10-28 RX ORDER — FENTANYL CITRATE 50 UG/ML
INJECTION, SOLUTION INTRAMUSCULAR; INTRAVENOUS AS NEEDED
Status: DISCONTINUED | OUTPATIENT
Start: 2021-10-28 | End: 2021-10-28 | Stop reason: SURG

## 2021-10-28 RX ORDER — EPHEDRINE SULFATE 50 MG/ML
5 INJECTION, SOLUTION INTRAVENOUS ONCE AS NEEDED
Status: DISCONTINUED | OUTPATIENT
Start: 2021-10-28 | End: 2021-10-28 | Stop reason: HOSPADM

## 2021-10-28 RX ORDER — LIDOCAINE HYDROCHLORIDE 20 MG/ML
INJECTION, SOLUTION INFILTRATION; PERINEURAL AS NEEDED
Status: DISCONTINUED | OUTPATIENT
Start: 2021-10-28 | End: 2021-10-28 | Stop reason: SURG

## 2021-10-28 RX ORDER — HYDRALAZINE HYDROCHLORIDE 20 MG/ML
5 INJECTION INTRAMUSCULAR; INTRAVENOUS
Status: DISCONTINUED | OUTPATIENT
Start: 2021-10-28 | End: 2021-10-28 | Stop reason: HOSPADM

## 2021-10-28 RX ORDER — SODIUM CHLORIDE 0.9 % (FLUSH) 0.9 %
10 SYRINGE (ML) INJECTION AS NEEDED
Status: DISCONTINUED | OUTPATIENT
Start: 2021-10-28 | End: 2021-10-28 | Stop reason: HOSPADM

## 2021-10-28 RX ORDER — DIPHENHYDRAMINE HYDROCHLORIDE 50 MG/ML
12.5 INJECTION INTRAMUSCULAR; INTRAVENOUS
Status: DISCONTINUED | OUTPATIENT
Start: 2021-10-28 | End: 2021-10-28 | Stop reason: HOSPADM

## 2021-10-28 RX ORDER — ONDANSETRON 2 MG/ML
INJECTION INTRAMUSCULAR; INTRAVENOUS AS NEEDED
Status: DISCONTINUED | OUTPATIENT
Start: 2021-10-28 | End: 2021-10-28 | Stop reason: SURG

## 2021-10-28 RX ORDER — ERYTHROMYCIN 5 MG/G
OINTMENT OPHTHALMIC 2 TIMES DAILY
Qty: 3.5 G | Refills: 1 | Status: SHIPPED | OUTPATIENT
Start: 2021-10-28

## 2021-10-28 RX ORDER — FLUMAZENIL 0.1 MG/ML
0.2 INJECTION INTRAVENOUS AS NEEDED
Status: DISCONTINUED | OUTPATIENT
Start: 2021-10-28 | End: 2021-10-28 | Stop reason: HOSPADM

## 2021-10-28 RX ORDER — PROMETHAZINE HYDROCHLORIDE 25 MG/1
25 SUPPOSITORY RECTAL ONCE AS NEEDED
Status: DISCONTINUED | OUTPATIENT
Start: 2021-10-28 | End: 2021-10-28 | Stop reason: HOSPADM

## 2021-10-28 RX ORDER — LIDOCAINE HYDROCHLORIDE 10 MG/ML
0.5 INJECTION, SOLUTION EPIDURAL; INFILTRATION; INTRACAUDAL; PERINEURAL ONCE AS NEEDED
Status: DISCONTINUED | OUTPATIENT
Start: 2021-10-28 | End: 2021-10-28 | Stop reason: HOSPADM

## 2021-10-28 RX ORDER — FENTANYL CITRATE 50 UG/ML
25 INJECTION, SOLUTION INTRAMUSCULAR; INTRAVENOUS
Status: DISCONTINUED | OUTPATIENT
Start: 2021-10-28 | End: 2021-10-28 | Stop reason: HOSPADM

## 2021-10-28 RX ORDER — IBUPROFEN 600 MG/1
600 TABLET ORAL ONCE AS NEEDED
Status: DISCONTINUED | OUTPATIENT
Start: 2021-10-28 | End: 2021-10-28 | Stop reason: HOSPADM

## 2021-10-28 RX ORDER — HYDROCODONE BITARTRATE AND ACETAMINOPHEN 7.5; 325 MG/1; MG/1
1 TABLET ORAL ONCE AS NEEDED
Status: COMPLETED | OUTPATIENT
Start: 2021-10-28 | End: 2021-10-28

## 2021-10-28 RX ORDER — PHENYLEPHRINE HYDROCHLORIDE 10 MG/ML
INJECTION INTRAVENOUS AS NEEDED
Status: DISCONTINUED | OUTPATIENT
Start: 2021-10-28 | End: 2021-10-28 | Stop reason: SURG

## 2021-10-28 RX ORDER — DEXAMETHASONE SODIUM PHOSPHATE 10 MG/ML
INJECTION INTRAMUSCULAR; INTRAVENOUS AS NEEDED
Status: DISCONTINUED | OUTPATIENT
Start: 2021-10-28 | End: 2021-10-28 | Stop reason: SURG

## 2021-10-28 RX ORDER — PROPOFOL 10 MG/ML
VIAL (ML) INTRAVENOUS AS NEEDED
Status: DISCONTINUED | OUTPATIENT
Start: 2021-10-28 | End: 2021-10-28 | Stop reason: SURG

## 2021-10-28 RX ORDER — EPHEDRINE SULFATE 50 MG/ML
INJECTION, SOLUTION INTRAVENOUS AS NEEDED
Status: DISCONTINUED | OUTPATIENT
Start: 2021-10-28 | End: 2021-10-28 | Stop reason: SURG

## 2021-10-28 RX ORDER — SODIUM CHLORIDE, SODIUM LACTATE, POTASSIUM CHLORIDE, CALCIUM CHLORIDE 600; 310; 30; 20 MG/100ML; MG/100ML; MG/100ML; MG/100ML
9 INJECTION, SOLUTION INTRAVENOUS CONTINUOUS PRN
Status: DISCONTINUED | OUTPATIENT
Start: 2021-10-28 | End: 2021-10-28 | Stop reason: HOSPADM

## 2021-10-28 RX ORDER — HYDROMORPHONE HYDROCHLORIDE 1 MG/ML
0.25 INJECTION, SOLUTION INTRAMUSCULAR; INTRAVENOUS; SUBCUTANEOUS
Status: DISCONTINUED | OUTPATIENT
Start: 2021-10-28 | End: 2021-10-28 | Stop reason: HOSPADM

## 2021-10-28 RX ORDER — GLYCOPYRROLATE 0.2 MG/ML
INJECTION INTRAMUSCULAR; INTRAVENOUS AS NEEDED
Status: DISCONTINUED | OUTPATIENT
Start: 2021-10-28 | End: 2021-10-28 | Stop reason: SURG

## 2021-10-28 RX ADMIN — FENTANYL CITRATE 25 MCG: 50 INJECTION INTRAMUSCULAR; INTRAVENOUS at 11:41

## 2021-10-28 RX ADMIN — PHENYLEPHRINE HYDROCHLORIDE 150 MCG: 10 INJECTION, SOLUTION INTRAVENOUS at 11:57

## 2021-10-28 RX ADMIN — LIDOCAINE HYDROCHLORIDE 60 MG: 20 INJECTION, SOLUTION INFILTRATION; PERINEURAL at 11:41

## 2021-10-28 RX ADMIN — DEXAMETHASONE SODIUM PHOSPHATE 8 MG: 10 INJECTION INTRAMUSCULAR; INTRAVENOUS at 11:46

## 2021-10-28 RX ADMIN — HYDROCODONE BITARTRATE AND ACETAMINOPHEN 1 TABLET: 7.5; 325 TABLET ORAL at 13:20

## 2021-10-28 RX ADMIN — EPHEDRINE SULFATE 10 MG: 50 INJECTION INTRAVENOUS at 12:13

## 2021-10-28 RX ADMIN — PROPOFOL 120 MG: 10 INJECTION, EMULSION INTRAVENOUS at 11:41

## 2021-10-28 RX ADMIN — PHENYLEPHRINE HYDROCHLORIDE 100 MCG: 10 INJECTION, SOLUTION INTRAVENOUS at 11:52

## 2021-10-28 RX ADMIN — FENTANYL CITRATE 50 MCG: 50 INJECTION INTRAMUSCULAR; INTRAVENOUS at 12:52

## 2021-10-28 RX ADMIN — FENTANYL CITRATE 25 MCG: 50 INJECTION INTRAMUSCULAR; INTRAVENOUS at 12:22

## 2021-10-28 RX ADMIN — SODIUM CHLORIDE, POTASSIUM CHLORIDE, SODIUM LACTATE AND CALCIUM CHLORIDE 9 ML/HR: 600; 310; 30; 20 INJECTION, SOLUTION INTRAVENOUS at 10:04

## 2021-10-28 RX ADMIN — ONDANSETRON 4 MG: 2 INJECTION INTRAMUSCULAR; INTRAVENOUS at 12:47

## 2021-10-28 RX ADMIN — GLYCOPYRROLATE 0.1 MG: 0.2 INJECTION INTRAMUSCULAR; INTRAVENOUS at 11:41

## 2021-10-28 NOTE — ANESTHESIA PROCEDURE NOTES
Airway  Urgency: elective    Date/Time: 10/28/2021 11:43 AM  Airway not difficult    General Information and Staff    Patient location during procedure: OR  Anesthesiologist: Milad Perez MD  CRNA: Denia Pickett CRNA    Indications and Patient Condition  Indications for airway management: airway protection    Preoxygenated: yes  MILS maintained throughout  Mask difficulty assessment: 0 - not attempted    Final Airway Details  Final airway type: supraglottic airway      Successful airway: unique  Size 4    Number of attempts at approach: 1  Assessment: lips, teeth, and gum same as pre-op

## 2021-10-28 NOTE — ANESTHESIA PREPROCEDURE EVALUATION
Anesthesia Evaluation     Patient summary reviewed and Nursing notes reviewed   NPO Solid Status: > 8 hours  NPO Liquid Status: > 2 hours           Airway   Mallampati: II  TM distance: >3 FB  Neck ROM: full  No difficulty expected  Dental    (+) partials and upper dentures    Pulmonary - normal exam   (+) a smoker Former, COPD mild,   Cardiovascular - normal exam  Exercise tolerance: good (4-7 METS)    ECG reviewed  PT is on anticoagulation therapy    (+) hypertension, PVD,       Neuro/Psych- negative ROS  GI/Hepatic/Renal/Endo    (+)  GERD,      Musculoskeletal     Abdominal    Substance History      OB/GYN          Other   arthritis,    history of cancer remission                    Anesthesia Plan    ASA 3     general     intravenous induction     Anesthetic plan, all risks, benefits, and alternatives have been provided, discussed and informed consent has been obtained with: patient.    Plan discussed with CRNA.

## 2021-10-28 NOTE — ANESTHESIA POSTPROCEDURE EVALUATION
Patient: Elaina Molina    Procedure Summary     Date: 10/28/21 Room / Location:  [facility]  /  [facility]    Anesthesia Start: 1138 Anesthesia Stop: 1302    Procedures:       BILATERAL UPPER BLEPHAROPLASTY (Bilateral Eye)      TEMPORAY  DIRECT BROWLIFT (N/A Face) Diagnosis:     Surgeons: Luke Gong MD Provider: Milad Perez MD    Anesthesia Type: general ASA Status: 3          Anesthesia Type: general    Vitals  Vitals Value Taken Time   /53 10/28/21 1416   Temp 36.7 °C (98 °F) 10/28/21 1415   Pulse 70 10/28/21 1416   Resp 18 10/28/21 1415   SpO2 96 % 10/28/21 1416   Vitals shown include unvalidated device data.        Post Anesthesia Care and Evaluation    Patient participation: complete - patient participated  Level of consciousness: awake  Pain management: adequate  Airway patency: patent  Anesthetic complications: No anesthetic complications    Cardiovascular status: acceptable  Respiratory status: acceptable  Hydration status: acceptable    Comments: /53   Pulse 74   Temp 36.7 °C (98 °F) (Temporal)   Resp 18   SpO2 95%

## 2022-02-14 DIAGNOSIS — M17.11 PRIMARY OSTEOARTHRITIS OF RIGHT KNEE: Primary | ICD-10-CM

## 2022-02-14 DIAGNOSIS — M25.571 CHRONIC PAIN OF RIGHT ANKLE: ICD-10-CM

## 2022-02-14 DIAGNOSIS — G89.29 CHRONIC PAIN OF RIGHT ANKLE: ICD-10-CM

## 2022-02-17 ENCOUNTER — OFFICE VISIT (OUTPATIENT)
Dept: ORTHOPEDIC SURGERY | Facility: CLINIC | Age: 67
End: 2022-02-17

## 2022-02-17 VITALS — HEIGHT: 64 IN | TEMPERATURE: 97.6 F | BODY MASS INDEX: 23.05 KG/M2 | WEIGHT: 135 LBS

## 2022-02-17 DIAGNOSIS — M17.31 POST-TRAUMATIC OSTEOARTHRITIS OF RIGHT KNEE: Primary | ICD-10-CM

## 2022-02-17 PROCEDURE — 99213 OFFICE O/P EST LOW 20 MIN: CPT | Performed by: ORTHOPAEDIC SURGERY

## 2022-02-17 RX ORDER — ASPIRIN 81 MG/1
81 TABLET ORAL DAILY
COMMUNITY

## 2022-02-17 RX ORDER — ALENDRONATE SODIUM 70 MG/1
70 TABLET ORAL
COMMUNITY
Start: 2022-01-03 | End: 2023-01-03

## 2022-02-17 NOTE — PROGRESS NOTES
"Chief Complaint  Follow-up and Pain of the Right Knee and Follow-up and Pain of the Right Shoulder    Subjective    History of Present Illness      Elaina Molina is a 66 y.o. female who presents to [facility] for follow-up on a right distal fibula fracture which is healed and right knee posttraumatic osteoarthritis.  History of Present Illness the patient presents to the office with the status quo function of the knee.  She does have medial sided joint line pain and tenderness.  She also has some swelling on the medial aspect of the knee.  She has a progressive varus alignment of the knee.  The fibular fracture has completely healed by now.  The patient states that she is functioning reasonably well other than the swelling around the knee joint.  She would like to continue with nonoperative management as long as possible.  She does understand that she is going to need knee replacement surgery in the near future based on symptom progression.  Pain Location:  RIGHT knee  Radiation: none  Quality: dull, aching  Intensity/Severity: mild-moderate  Duration: several years  Progression of symptoms: no worsening, symptoms stable/unchanged  Onset quality: gradual   Timing: intermittent  Aggravating Factors: going up and down stairs, rising after sitting, squatting  Alleviating Factors: NSAIDs  Previous Episodes: yes  Associated Symptoms: pain, swelling  ADLs Affected: ambulating, recreational activities/sports  Previous Treatment: NSAIDs and brace       Objective   Vital Signs:   Temp 97.6 °F (36.4 °C)   Ht 163.8 cm (64.49\")   Wt 61.2 kg (135 lb)   BMI 22.82 kg/m²     Physical Exam  Physical Exam  Vitals signs and nursing note reviewed.   Constitutional:       Appearance: Normal appearance.   Pulmonary:      Effort: Pulmonary effort is normal.   Skin:     General: Skin is warm and dry.      Capillary Refill: Capillary refill takes less than 2 seconds.   Neurological:      General: No " focal deficit present.      Mental Status: He is alert and oriented to person, place, and time. Mental status is at baseline.   Psychiatric:         Mood and Affect: Mood normal.         Behavior: Behavior normal.         Thought Content: Thought content normal.         Judgment: Judgment normal.     Ortho Exam   Right knee (varus). Patient has crepitus throughout range of motion. Positive patellar grind test. Mild effusion. Lachman is negative. Pivot shift is negative. Anterior and posterior drawer signs are negative. Significant joint line tenderness is noted on the medial aspect of the knee. Patient has a varus orientation of the knee. There is fullness and tenderness in the Popliteal fossa. Mild distention of a Popliteal cyst is noted in this location. Range of motion in flexion is from 0-110 degrees. Neurovascular status is intact.  Dorsalis pedis and posterior tibial artery pulses are palpable. Common peroneal nerve function is well preserved. Patient's gait is cautious and antalgic. Skin and soft tissues are mildly swollen, consistent with synovitis and effusion. The patient has a significant limp with the first few steps after starting the gait cycle. Getting out of a chair takes a lot of effort due to pain on knee flexion.         Result Review :   The following data was reviewed by: Polo Tucker MD on 02/17/2022:  Radiologic studies - see below for interpretation  xrays to be obtained at next visit            Procedures           Assessment   Assessment and Plan    Diagnoses and all orders for this visit:    1. Post-traumatic osteoarthritis of right knee (Primary)          Follow Up   · Compression/brace to prevent the knee from buckling and giving out.  · Intra-articular steroid injection and viscosupplementation injections discussed with the patient to manage the symptoms nonoperatively.  · Calcium and vitamin D for bone health.  · We have discussed the pros and cons as well as the risks and benefits of  knee replacement surgery for this posttraumatic knee condition with the patient and she understands and accepts that.  She will let me know when she is ready for that form of intervention.  · Rest, ice, compression, and elevation (RICE) therapy  · Stretching and strengthening exercises  · She will eventually need a right total knee arthroplasty  · OTC Alternate Ibuprofen and Tylenol as needed  · Follow up in 1 year(s)  • Patient was given instructions and counseling regarding her condition or for health maintenance advice. Please see specific information pulled into the AVS if appropriate.     Polo Tucker MD   Date of Encounter: 2/17/2022        EMR Dragon/Transcription disclaimer:  Much of this encounter note is an electronic transcription/translation of spoken language to printed text. The electronic translation of spoken language may permit erroneous, or at times, nonsensical words or phrases to be inadvertently transcribed; Although I have reviewed the note for such errors, some may still exist.

## 2022-06-30 ENCOUNTER — APPOINTMENT (OUTPATIENT)
Dept: WOMENS IMAGING | Facility: HOSPITAL | Age: 67
End: 2022-06-30

## 2022-06-30 PROCEDURE — 77063 BREAST TOMOSYNTHESIS BI: CPT | Performed by: RADIOLOGY

## 2022-06-30 PROCEDURE — 77067 SCR MAMMO BI INCL CAD: CPT | Performed by: RADIOLOGY

## 2023-05-12 DIAGNOSIS — M17.31 POST-TRAUMATIC OSTEOARTHRITIS OF RIGHT KNEE: ICD-10-CM

## 2023-05-12 DIAGNOSIS — M25.571 CHRONIC PAIN OF RIGHT ANKLE: Primary | ICD-10-CM

## 2023-05-12 DIAGNOSIS — G89.29 CHRONIC PAIN OF RIGHT ANKLE: Primary | ICD-10-CM

## 2023-10-24 ENCOUNTER — APPOINTMENT (OUTPATIENT)
Dept: WOMENS IMAGING | Facility: HOSPITAL | Age: 68
End: 2023-10-24
Payer: MEDICARE

## 2023-10-24 PROCEDURE — 77067 SCR MAMMO BI INCL CAD: CPT | Performed by: RADIOLOGY

## 2023-10-24 PROCEDURE — 77063 BREAST TOMOSYNTHESIS BI: CPT | Performed by: RADIOLOGY

## 2023-12-28 RX ORDER — CLOPIDOGREL BISULFATE 75 MG/1
75 TABLET ORAL DAILY
Qty: 90 TABLET | Refills: 3 | OUTPATIENT
Start: 2023-12-28

## 2024-11-29 ENCOUNTER — INPATIENT HOSPITAL (OUTPATIENT)
Age: 69
End: 2024-11-29
Payer: MEDICARE

## 2024-11-29 ENCOUNTER — INPATIENT HOSPITAL (OUTPATIENT)
Dept: URBAN - METROPOLITAN AREA HOSPITAL 107 | Facility: HOSPITAL | Age: 69
End: 2024-11-29
Payer: MEDICARE

## 2024-11-29 DIAGNOSIS — K86.81 EXOCRINE PANCREATIC INSUFFICIENCY: ICD-10-CM

## 2024-11-29 DIAGNOSIS — D50.9 IRON DEFICIENCY ANEMIA, UNSPECIFIED: ICD-10-CM

## 2024-11-29 DIAGNOSIS — K90.0 CELIAC DISEASE: ICD-10-CM

## 2024-11-29 DIAGNOSIS — K92.1 MELENA: ICD-10-CM

## 2024-11-29 DIAGNOSIS — K21.9 GASTRO-ESOPHAGEAL REFLUX DISEASE WITHOUT ESOPHAGITIS: ICD-10-CM

## 2024-11-29 PROCEDURE — 99222 1ST HOSP IP/OBS MODERATE 55: CPT | Performed by: INTERNAL MEDICINE

## 2024-12-01 ENCOUNTER — INPATIENT HOSPITAL (OUTPATIENT)
Age: 69
End: 2024-12-01
Payer: MEDICARE

## 2024-12-01 ENCOUNTER — INPATIENT HOSPITAL (OUTPATIENT)
Dept: URBAN - METROPOLITAN AREA HOSPITAL 107 | Facility: HOSPITAL | Age: 69
End: 2024-12-01
Payer: MEDICARE

## 2024-12-01 DIAGNOSIS — K92.1 MELENA: ICD-10-CM

## 2024-12-01 DIAGNOSIS — K90.0 CELIAC DISEASE: ICD-10-CM

## 2024-12-01 DIAGNOSIS — D50.9 IRON DEFICIENCY ANEMIA, UNSPECIFIED: ICD-10-CM

## 2024-12-01 DIAGNOSIS — K21.9 GASTRO-ESOPHAGEAL REFLUX DISEASE WITHOUT ESOPHAGITIS: ICD-10-CM

## 2024-12-01 DIAGNOSIS — K86.81 EXOCRINE PANCREATIC INSUFFICIENCY: ICD-10-CM

## 2024-12-01 PROCEDURE — 99232 SBSQ HOSP IP/OBS MODERATE 35: CPT | Performed by: INTERNAL MEDICINE

## 2024-12-02 ENCOUNTER — INPATIENT HOSPITAL (OUTPATIENT)
Age: 69
End: 2024-12-02
Payer: MEDICARE

## 2024-12-02 ENCOUNTER — INPATIENT HOSPITAL (OUTPATIENT)
Dept: URBAN - METROPOLITAN AREA HOSPITAL 107 | Facility: HOSPITAL | Age: 69
End: 2024-12-02
Payer: MEDICARE

## 2024-12-02 DIAGNOSIS — K86.81 EXOCRINE PANCREATIC INSUFFICIENCY: ICD-10-CM

## 2024-12-02 DIAGNOSIS — K29.80 DUODENITIS WITHOUT BLEEDING: ICD-10-CM

## 2024-12-02 DIAGNOSIS — R19.5 OTHER FECAL ABNORMALITIES: ICD-10-CM

## 2024-12-02 DIAGNOSIS — D50.9 IRON DEFICIENCY ANEMIA, UNSPECIFIED: ICD-10-CM

## 2024-12-02 DIAGNOSIS — K31.89 OTHER DISEASES OF STOMACH AND DUODENUM: ICD-10-CM

## 2024-12-02 DIAGNOSIS — K90.0 CELIAC DISEASE: ICD-10-CM

## 2024-12-02 PROCEDURE — 43239 EGD BIOPSY SINGLE/MULTIPLE: CPT | Performed by: INTERNAL MEDICINE

## 2025-01-16 ENCOUNTER — PATIENT ROUNDING (BHMG ONLY) (OUTPATIENT)
Dept: URGENT CARE | Facility: CLINIC | Age: 70
End: 2025-01-16
Payer: MEDICARE

## 2025-01-16 NOTE — ED NOTES
Thank you for letting us care for you during your recent visit at [facility]. We would love to hear about your experience with us.         We’re always looking for ways to make our patients’ experiences even better. Do you have any recommendations on ways we may improve?         I appreciate you taking the time to respond. Please be on the lookout for a survey about your recent visit from [facility] via text or email. We would greatly appreciate if you could fill that out and turn it back in. We want your voice to be heard and we value your feedback.         Thank you for choosing [facility] for your healthcare needs.

## 2025-02-19 ENCOUNTER — OFFICE (OUTPATIENT)
Dept: URBAN - METROPOLITAN AREA CLINIC 76 | Facility: CLINIC | Age: 70
End: 2025-02-19

## 2025-02-19 ENCOUNTER — APPOINTMENT (OUTPATIENT)
Dept: WOMENS IMAGING | Facility: HOSPITAL | Age: 70
End: 2025-02-19
Payer: MEDICARE

## 2025-02-19 VITALS
HEART RATE: 95 BPM | DIASTOLIC BLOOD PRESSURE: 72 MMHG | SYSTOLIC BLOOD PRESSURE: 141 MMHG | HEIGHT: 63 IN | SYSTOLIC BLOOD PRESSURE: 138 MMHG | DIASTOLIC BLOOD PRESSURE: 64 MMHG | WEIGHT: 125 LBS

## 2025-02-19 DIAGNOSIS — K21.9 GASTRO-ESOPHAGEAL REFLUX DISEASE WITHOUT ESOPHAGITIS: ICD-10-CM

## 2025-02-19 DIAGNOSIS — K92.2 GASTROINTESTINAL HEMORRHAGE, UNSPECIFIED: ICD-10-CM

## 2025-02-19 DIAGNOSIS — D50.9 IRON DEFICIENCY ANEMIA, UNSPECIFIED: ICD-10-CM

## 2025-02-19 PROCEDURE — 77063 BREAST TOMOSYNTHESIS BI: CPT | Performed by: RADIOLOGY

## 2025-02-19 PROCEDURE — 77067 SCR MAMMO BI INCL CAD: CPT | Performed by: RADIOLOGY

## 2025-02-19 PROCEDURE — 99213 OFFICE O/P EST LOW 20 MIN: CPT

## (undated) DEVICE — GLV SURG BIOGEL SENSR LTX PF SZ7.5

## (undated) DEVICE — PENCL E/S ULTRAVAC TELESCP NOSE HOLSTR 10FT

## (undated) DEVICE — PATIENT RETURN ELECTRODE, SINGLE-USE, CONTACT QUALITY MONITORING, ADULT, WITH 9FT CORD, FOR PATIENTS WEIGING OVER 33LBS. (15KG): Brand: MEGADYNE

## (undated) DEVICE — HDRST POSITIONING FM RND 2X9IN

## (undated) DEVICE — PK ENT 40

## (undated) DEVICE — INTENDED FOR TISSUE SEPARATION, AND OTHER PROCEDURES THAT REQUIRE A SHARP SURGICAL BLADE TO PUNCTURE OR CUT.: Brand: BARD-PARKER ® CARBON RIB-BACK BLADES

## (undated) DEVICE — WIPE INST MEROCEL

## (undated) DEVICE — ELECTRD BLD EZ CLN MOD 2.5IN

## (undated) DEVICE — CROUCH CORNEAL PROTECTOR: Brand: BAUSCH + LOMB

## (undated) DEVICE — SUT PROLN 5/0 P3 18IN 8698G

## (undated) DEVICE — STERILE TOOTHPICK SET: Brand: CENTURION

## (undated) DEVICE — NDL HYPO PRECISIONGLIDE REG 25G 1 1/2

## (undated) DEVICE — SUT VIC 5/0 P3 18IN J493G

## (undated) DEVICE — SUT GUT PLN FAST ABS 5/0 PC1 18IN 1915G

## (undated) DEVICE — SWABSTK SKINPREP PVPI LF PK/3

## (undated) DEVICE — TRAP FLD MINIVAC MEGADYNE 100ML

## (undated) DEVICE — ELECTRD NDL EZ CLN MOD 2.75IN

## (undated) DEVICE — STERILE COTTON TIP 6IN 10PK: Brand: MEDLINE